# Patient Record
Sex: FEMALE | Race: OTHER | NOT HISPANIC OR LATINO
[De-identification: names, ages, dates, MRNs, and addresses within clinical notes are randomized per-mention and may not be internally consistent; named-entity substitution may affect disease eponyms.]

---

## 2019-01-14 ENCOUNTER — RESULT REVIEW (OUTPATIENT)
Age: 44
End: 2019-01-14

## 2019-05-13 PROBLEM — Z00.00 ENCOUNTER FOR PREVENTIVE HEALTH EXAMINATION: Status: ACTIVE | Noted: 2019-05-13

## 2019-05-16 ENCOUNTER — APPOINTMENT (OUTPATIENT)
Dept: ORTHOPEDIC SURGERY | Facility: CLINIC | Age: 44
End: 2019-05-16
Payer: COMMERCIAL

## 2019-05-16 VITALS
HEART RATE: 69 BPM | WEIGHT: 170 LBS | SYSTOLIC BLOOD PRESSURE: 107 MMHG | DIASTOLIC BLOOD PRESSURE: 72 MMHG | HEIGHT: 70 IN | BODY MASS INDEX: 24.34 KG/M2

## 2019-05-16 DIAGNOSIS — Z78.9 OTHER SPECIFIED HEALTH STATUS: ICD-10-CM

## 2019-05-16 DIAGNOSIS — Z80.9 FAMILY HISTORY OF MALIGNANT NEOPLASM, UNSPECIFIED: ICD-10-CM

## 2019-05-16 PROCEDURE — 73140 X-RAY EXAM OF FINGER(S): CPT | Mod: F7

## 2019-05-16 PROCEDURE — 99203 OFFICE O/P NEW LOW 30 MIN: CPT

## 2019-05-16 RX ORDER — ACETAMINOPHEN 325 MG/1
TABLET, FILM COATED ORAL
Refills: 0 | Status: ACTIVE | COMMUNITY

## 2019-05-16 NOTE — DISCUSSION/SUMMARY
[de-identified] : The underlying pathophysiology was reviewed with the patient. Treatment options were discussed including; observation and surgical intervention. \par \par Elective surgical intervention was discussed with the patient. The risks and benefits were reviewed. \par Patient was advised to continue with observation of her symptoms for the interim.

## 2019-05-16 NOTE — END OF VISIT
[FreeTextEntry3] : I, Trever Duckworth MD, ordering physician, have read and attest that all the information, medical decision making and discharge instructions within are true and accurate.

## 2019-05-16 NOTE — ADDENDUM
[FreeTextEntry1] : I, Maranda Hill wrote this note acting as a scribe for Dr. Trever Duckworth on May 16, 2019.

## 2019-05-16 NOTE — PHYSICAL EXAM
[de-identified] : AP, lateral and oblique views of the right hand were obtained today and revealed no abnormalities.there is a soft tissue mass in the long finger. [de-identified] : Patient is WDWN, alert, and in no acute distress. Breathing is unlabored. She is grossly oriented to person, place, and time. \par \par Right hand: Ulnar mass over the long finger. Additional masses over the PIP joint. There is no tenderness to palpation or pain with axial compression. There is full arc of motion in the fingers. All intrinsic and extrinsic hand muscles 5/5. No joint instability on provocative testing. Sensation is intact to light touch.

## 2019-05-16 NOTE — HISTORY OF PRESENT ILLNESS
[de-identified] : Pt is a 42 y/o RHD female with mass in right long finger for 4-5 years.  It has been getting larger over time.  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  The first operation was performed 15 years ago at [facility] by a physician whose name she cannot recall.  The second surgery was performed by Dr. Lee approximately 7 years ago.  She returned to Dr. Lee when the tumor returned.  She was seen in this office at that time and she was told to treat it conservatively.  She states that she was told that she also has cysts in her right long finger PIP joint.  Her ROM is restricted.  She does not generally have pain but it can be very painful if she bangs her finger against anything.  Denies numbness or tingling.\par

## 2019-08-01 ENCOUNTER — TRANSCRIPTION ENCOUNTER (OUTPATIENT)
Age: 44
End: 2019-08-01

## 2020-06-01 ENCOUNTER — TRANSCRIPTION ENCOUNTER (OUTPATIENT)
Age: 45
End: 2020-06-01

## 2021-06-22 ENCOUNTER — RESULT REVIEW (OUTPATIENT)
Age: 46
End: 2021-06-22

## 2022-12-19 ENCOUNTER — APPOINTMENT (OUTPATIENT)
Dept: OBGYN | Facility: CLINIC | Age: 47
End: 2022-12-19

## 2022-12-19 PROCEDURE — 76830 TRANSVAGINAL US NON-OB: CPT

## 2022-12-19 PROCEDURE — 82270 OCCULT BLOOD FECES: CPT

## 2022-12-19 PROCEDURE — 76856 US EXAM PELVIC COMPLETE: CPT | Mod: 59

## 2022-12-19 PROCEDURE — 81002 URINALYSIS NONAUTO W/O SCOPE: CPT

## 2022-12-19 PROCEDURE — 99396 PREV VISIT EST AGE 40-64: CPT | Mod: 25

## 2022-12-27 ENCOUNTER — NON-APPOINTMENT (OUTPATIENT)
Age: 47
End: 2022-12-27

## 2022-12-27 ENCOUNTER — APPOINTMENT (OUTPATIENT)
Dept: ORTHOPEDIC SURGERY | Facility: CLINIC | Age: 47
End: 2022-12-27
Payer: COMMERCIAL

## 2022-12-27 VITALS — BODY MASS INDEX: 24.34 KG/M2 | WEIGHT: 170 LBS | HEIGHT: 70 IN

## 2022-12-27 PROCEDURE — 99204 OFFICE O/P NEW MOD 45 MIN: CPT

## 2022-12-28 ENCOUNTER — APPOINTMENT (OUTPATIENT)
Dept: MAMMOGRAPHY | Facility: CLINIC | Age: 47
End: 2022-12-28

## 2022-12-28 PROCEDURE — 77067 SCR MAMMO BI INCL CAD: CPT

## 2022-12-28 PROCEDURE — 77063 BREAST TOMOSYNTHESIS BI: CPT

## 2022-12-31 ENCOUNTER — APPOINTMENT (OUTPATIENT)
Dept: MRI IMAGING | Facility: CLINIC | Age: 47
End: 2022-12-31

## 2023-01-05 ENCOUNTER — APPOINTMENT (OUTPATIENT)
Dept: OBGYN | Facility: CLINIC | Age: 48
End: 2023-01-05

## 2023-01-07 NOTE — PHYSICAL EXAM
[de-identified] : Gen: NAD\par RSP: Nonlabored\par MSK:\par JUANITA was seen and examined\par She has palpable masses along the right long finger about the PIPJ both proximally and distally.  She has restricted arc of motion with 3cm DPC at the long finger.  SILT throughout.  Incisions are cd/i, < 2s capillary refill

## 2023-01-07 NOTE — ASSESSMENT
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  She has had 2 prior excisions and has a stiff long finger.  She has had another recurrence.  Discussed medical therapy such as pexidartinib, repeat surgical excision with contracture release, amputation.  Would recommend MRI prior to further discussion regarding treatment plan.\par \par Plan:\par MRI R Hand to assess GCTTS at the long finger\par F/u after MRI

## 2023-01-07 NOTE — HISTORY OF PRESENT ILLNESS
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  She has had two prior surgeries.  The first was performed at [facility] approximately 20 years ago and the second by Dr. Lee 10 years ago.  She has palpable recurrence.  She reports restricted ROM at the digit which makes her hesitant to perform another surgery.  Discussed pexidartinib and its side effect profile.  She has not had treatment with this medication in the past.  She has no numbness and tingling.  Discussed that I would not expect improvement in ROM and in fact she may have more stiffness from surgery.

## 2023-01-17 ENCOUNTER — APPOINTMENT (OUTPATIENT)
Dept: MRI IMAGING | Facility: CLINIC | Age: 48
End: 2023-01-17
Payer: COMMERCIAL

## 2023-01-17 ENCOUNTER — OUTPATIENT (OUTPATIENT)
Dept: OUTPATIENT SERVICES | Facility: HOSPITAL | Age: 48
LOS: 1 days | End: 2023-01-17
Payer: COMMERCIAL

## 2023-01-17 DIAGNOSIS — D48.9 NEOPLASM OF UNCERTAIN BEHAVIOR, UNSPECIFIED: ICD-10-CM

## 2023-01-17 PROCEDURE — 73220 MRI UPPR EXTREMITY W/O&W/DYE: CPT | Mod: 26,RT

## 2023-01-17 PROCEDURE — A9585: CPT

## 2023-01-17 PROCEDURE — 73220 MRI UPPR EXTREMITY W/O&W/DYE: CPT

## 2023-01-18 ENCOUNTER — APPOINTMENT (OUTPATIENT)
Dept: ULTRASOUND IMAGING | Facility: CLINIC | Age: 48
End: 2023-01-18
Payer: COMMERCIAL

## 2023-01-18 ENCOUNTER — OUTPATIENT (OUTPATIENT)
Dept: OUTPATIENT SERVICES | Facility: HOSPITAL | Age: 48
LOS: 1 days | End: 2023-01-18
Payer: COMMERCIAL

## 2023-01-18 DIAGNOSIS — Z00.8 ENCOUNTER FOR OTHER GENERAL EXAMINATION: ICD-10-CM

## 2023-01-18 PROCEDURE — 76641 ULTRASOUND BREAST COMPLETE: CPT | Mod: 26,50

## 2023-01-18 PROCEDURE — 76641 ULTRASOUND BREAST COMPLETE: CPT

## 2023-02-02 ENCOUNTER — APPOINTMENT (OUTPATIENT)
Dept: OBGYN | Facility: CLINIC | Age: 48
End: 2023-02-02
Payer: COMMERCIAL

## 2023-02-02 PROCEDURE — 58301 REMOVE INTRAUTERINE DEVICE: CPT

## 2023-02-02 PROCEDURE — 76830 TRANSVAGINAL US NON-OB: CPT

## 2023-02-02 PROCEDURE — 36415 COLL VENOUS BLD VENIPUNCTURE: CPT

## 2023-02-02 PROCEDURE — 99213 OFFICE O/P EST LOW 20 MIN: CPT | Mod: 25

## 2023-02-05 ENCOUNTER — TRANSCRIPTION ENCOUNTER (OUTPATIENT)
Age: 48
End: 2023-02-05

## 2023-02-07 ENCOUNTER — APPOINTMENT (OUTPATIENT)
Dept: OBGYN | Facility: CLINIC | Age: 48
End: 2023-02-07
Payer: COMMERCIAL

## 2023-02-07 PROCEDURE — 58300 INSERT INTRAUTERINE DEVICE: CPT

## 2023-02-07 PROCEDURE — 76830 TRANSVAGINAL US NON-OB: CPT

## 2023-02-07 PROCEDURE — 81025 URINE PREGNANCY TEST: CPT

## 2023-02-16 ENCOUNTER — APPOINTMENT (OUTPATIENT)
Dept: MRI IMAGING | Facility: CLINIC | Age: 48
End: 2023-02-16
Payer: COMMERCIAL

## 2023-02-16 ENCOUNTER — OUTPATIENT (OUTPATIENT)
Dept: OUTPATIENT SERVICES | Facility: HOSPITAL | Age: 48
LOS: 1 days | End: 2023-02-16
Payer: COMMERCIAL

## 2023-02-16 DIAGNOSIS — Z00.8 ENCOUNTER FOR OTHER GENERAL EXAMINATION: ICD-10-CM

## 2023-02-16 PROCEDURE — C8937: CPT

## 2023-02-16 PROCEDURE — C8908: CPT

## 2023-02-16 PROCEDURE — A9585: CPT

## 2023-02-16 PROCEDURE — 77049 MRI BREAST C-+ W/CAD BI: CPT | Mod: 26

## 2023-02-17 ENCOUNTER — APPOINTMENT (OUTPATIENT)
Dept: ORTHOPEDIC SURGERY | Facility: CLINIC | Age: 48
End: 2023-02-17
Payer: COMMERCIAL

## 2023-02-17 PROCEDURE — 99214 OFFICE O/P EST MOD 30 MIN: CPT | Mod: 57

## 2023-02-20 ENCOUNTER — APPOINTMENT (OUTPATIENT)
Dept: OBGYN | Facility: CLINIC | Age: 48
End: 2023-02-20

## 2023-02-28 ENCOUNTER — APPOINTMENT (OUTPATIENT)
Dept: ULTRASOUND IMAGING | Facility: CLINIC | Age: 48
End: 2023-02-28
Payer: COMMERCIAL

## 2023-02-28 ENCOUNTER — RESULT REVIEW (OUTPATIENT)
Age: 48
End: 2023-02-28

## 2023-02-28 ENCOUNTER — APPOINTMENT (OUTPATIENT)
Dept: ULTRASOUND IMAGING | Facility: CLINIC | Age: 48
End: 2023-02-28

## 2023-02-28 ENCOUNTER — OUTPATIENT (OUTPATIENT)
Dept: OUTPATIENT SERVICES | Facility: HOSPITAL | Age: 48
LOS: 1 days | End: 2023-02-28
Payer: COMMERCIAL

## 2023-02-28 DIAGNOSIS — N63.0 UNSPECIFIED LUMP IN UNSPECIFIED BREAST: ICD-10-CM

## 2023-02-28 PROCEDURE — 77066 DX MAMMO INCL CAD BI: CPT | Mod: 26

## 2023-02-28 PROCEDURE — 77066 DX MAMMO INCL CAD BI: CPT

## 2023-02-28 PROCEDURE — 19084 BX BREAST ADD LESION US IMAG: CPT

## 2023-02-28 PROCEDURE — 88305 TISSUE EXAM BY PATHOLOGIST: CPT | Mod: 26

## 2023-02-28 PROCEDURE — 88305 TISSUE EXAM BY PATHOLOGIST: CPT

## 2023-02-28 PROCEDURE — A4648: CPT

## 2023-02-28 PROCEDURE — 19084 BX BREAST ADD LESION US IMAG: CPT | Mod: RT

## 2023-02-28 PROCEDURE — 19083 BX BREAST 1ST LESION US IMAG: CPT | Mod: LT

## 2023-02-28 PROCEDURE — 19083 BX BREAST 1ST LESION US IMAG: CPT

## 2023-03-13 ENCOUNTER — APPOINTMENT (OUTPATIENT)
Dept: OBGYN | Facility: CLINIC | Age: 48
End: 2023-03-13
Payer: COMMERCIAL

## 2023-03-13 PROCEDURE — 99213 OFFICE O/P EST LOW 20 MIN: CPT

## 2023-03-13 PROCEDURE — 76830 TRANSVAGINAL US NON-OB: CPT

## 2023-04-02 ENCOUNTER — NON-APPOINTMENT (OUTPATIENT)
Age: 48
End: 2023-04-02

## 2023-04-20 ENCOUNTER — OUTPATIENT (OUTPATIENT)
Dept: OUTPATIENT SERVICES | Facility: HOSPITAL | Age: 48
LOS: 1 days | End: 2023-04-20

## 2023-04-20 VITALS
HEART RATE: 72 BPM | DIASTOLIC BLOOD PRESSURE: 80 MMHG | SYSTOLIC BLOOD PRESSURE: 114 MMHG | WEIGHT: 179.9 LBS | TEMPERATURE: 97 F | HEIGHT: 70 IN | OXYGEN SATURATION: 98 % | RESPIRATION RATE: 16 BRPM

## 2023-04-20 DIAGNOSIS — D48.9 NEOPLASM OF UNCERTAIN BEHAVIOR, UNSPECIFIED: ICD-10-CM

## 2023-04-20 DIAGNOSIS — Z98.890 OTHER SPECIFIED POSTPROCEDURAL STATES: Chronic | ICD-10-CM

## 2023-04-20 DIAGNOSIS — R22.31 LOCALIZED SWELLING, MASS AND LUMP, RIGHT UPPER LIMB: ICD-10-CM

## 2023-04-20 LAB
HCG SERPL-ACNC: <5 MIU/ML — SIGNIFICANT CHANGE UP
HCT VFR BLD CALC: 35.7 % — SIGNIFICANT CHANGE UP (ref 34.5–45)
HGB BLD-MCNC: 10.9 G/DL — LOW (ref 11.5–15.5)
MCHC RBC-ENTMCNC: 22.2 PG — LOW (ref 27–34)
MCHC RBC-ENTMCNC: 30.5 GM/DL — LOW (ref 32–36)
MCV RBC AUTO: 72.7 FL — LOW (ref 80–100)
NRBC # BLD: 0 /100 WBCS — SIGNIFICANT CHANGE UP (ref 0–0)
NRBC # FLD: 0 K/UL — SIGNIFICANT CHANGE UP (ref 0–0)
PLATELET # BLD AUTO: 256 K/UL — SIGNIFICANT CHANGE UP (ref 150–400)
RBC # BLD: 4.91 M/UL — SIGNIFICANT CHANGE UP (ref 3.8–5.2)
RBC # FLD: 16 % — HIGH (ref 10.3–14.5)
WBC # BLD: 6.25 K/UL — SIGNIFICANT CHANGE UP (ref 3.8–10.5)
WBC # FLD AUTO: 6.25 K/UL — SIGNIFICANT CHANGE UP (ref 3.8–10.5)

## 2023-04-20 NOTE — H&P PST ADULT - PROBLEM SELECTOR PLAN 1
Patient scheduled for surgery on: 4/27/23  Patient provided with verbal and written presurgical instructions  Verbalized understanding  with teach back on the following: Famotidine for GI prophylaxis with small sip of water     Problem: Pre-menopausal   Urine specimen cup provided, UCG on DOS

## 2023-04-20 NOTE — H&P PST ADULT - MUSCULOSKELETAL COMMENTS
right middle finger with hard lateral mass, limited flexion of finger , Denies tenderness right middle finger mass

## 2023-04-20 NOTE — H&P PST ADULT - HISTORY OF PRESENT ILLNESS
47 yr old female presents for evaluation for right middle finger mass excision, reports mass has been removed twice in the past

## 2023-04-20 NOTE — H&P PST ADULT - NSANTHOSAYNRD_GEN_A_CORE
No. LAZARA screening performed.  STOP BANG Legend: 0-2 = LOW Risk; 3-4 = INTERMEDIATE Risk; 5-8 = HIGH Risk

## 2023-04-26 ENCOUNTER — TRANSCRIPTION ENCOUNTER (OUTPATIENT)
Age: 48
End: 2023-04-26

## 2023-04-27 ENCOUNTER — OUTPATIENT (OUTPATIENT)
Dept: OUTPATIENT SERVICES | Facility: HOSPITAL | Age: 48
LOS: 1 days | Discharge: HOME | End: 2023-04-27
Payer: COMMERCIAL

## 2023-04-27 ENCOUNTER — APPOINTMENT (OUTPATIENT)
Dept: ORTHOPEDIC SURGERY | Facility: AMBULATORY SURGERY CENTER | Age: 48
End: 2023-04-27

## 2023-04-27 ENCOUNTER — TRANSCRIPTION ENCOUNTER (OUTPATIENT)
Age: 48
End: 2023-04-27

## 2023-04-27 ENCOUNTER — RESULT REVIEW (OUTPATIENT)
Age: 48
End: 2023-04-27

## 2023-04-27 VITALS
RESPIRATION RATE: 18 BRPM | OXYGEN SATURATION: 99 % | SYSTOLIC BLOOD PRESSURE: 104 MMHG | WEIGHT: 180.78 LBS | TEMPERATURE: 98 F | HEART RATE: 61 BPM | DIASTOLIC BLOOD PRESSURE: 75 MMHG

## 2023-04-27 VITALS
HEART RATE: 69 BPM | TEMPERATURE: 98 F | DIASTOLIC BLOOD PRESSURE: 62 MMHG | OXYGEN SATURATION: 100 % | RESPIRATION RATE: 16 BRPM | SYSTOLIC BLOOD PRESSURE: 107 MMHG

## 2023-04-27 DIAGNOSIS — Z98.890 OTHER SPECIFIED POSTPROCEDURAL STATES: Chronic | ICD-10-CM

## 2023-04-27 DIAGNOSIS — R22.31 LOCALIZED SWELLING, MASS AND LUMP, RIGHT UPPER LIMB: ICD-10-CM

## 2023-04-27 PROCEDURE — 88342 IMHCHEM/IMCYTCHM 1ST ANTB: CPT | Mod: 26

## 2023-04-27 PROCEDURE — 88307 TISSUE EXAM BY PATHOLOGIST: CPT | Mod: 26

## 2023-04-27 PROCEDURE — 88311 DECALCIFY TISSUE: CPT | Mod: 26

## 2023-04-27 PROCEDURE — 26118 RAD RESECT HAND TUMOR 3 CM/>: CPT | Mod: F7

## 2023-04-27 RX ORDER — OXYCODONE 5 MG/1
5 TABLET ORAL
Qty: 5 | Refills: 0 | Status: ACTIVE | COMMUNITY
Start: 2023-04-27 | End: 1900-01-01

## 2023-04-27 NOTE — ASU DISCHARGE PLAN (ADULT/PEDIATRIC) - NS MD DC FALL RISK RISK
For information on Fall & Injury Prevention, visit: [email or web address] OR  [email or web address] OR  https://www.cdc.gov/steadi/patient.html

## 2023-04-27 NOTE — BRIEF OPERATIVE NOTE - NSICDXBRIEFPROCEDURE_GEN_ALL_CORE_FT
PROCEDURES:  Excision, soft tissue tumor, hand, subfascial, 1.5 cm or greater 27-Apr-2023 16:22:52  Nima Kent  
DISPLAY PLAN FREE TEXT

## 2023-04-27 NOTE — ASU PREOPERATIVE ASSESSMENT, ADULT (IPARK ONLY) - FALL HARM RISK - UNIVERSAL INTERVENTIONS
Bed in lowest position, wheels locked, appropriate side rails in place/Call bell, personal items and telephone in reach/Instruct patient to call for assistance before getting out of bed or chair/Non-slip footwear when patient is out of bed/Nada to call system/Physically safe environment - no spills, clutter or unnecessary equipment/Purposeful Proactive Rounding/Room/bathroom lighting operational, light cord in reach

## 2023-04-27 NOTE — ASU DISCHARGE PLAN (ADULT/PEDIATRIC) - CONDITION AT DISCHARGE
[address] [place] [address] Patient: Marvin David MRN: [id number] :1992 A check toro indicates which time of day the medication should be taken. My Medications ASK your doctor about these medications Instructions Each Dose to Equal  
 Morning Noon Evening Bedtime  
 ondansetron 4 mg disintegrating tablet Commonly known as:  ZOFRAN ODT Your last dose was: Your next dose is: Take 1 Tab by mouth every eight (8) hours as needed for Nausea. 4 mg  
    
   
   
   
  
 PANZNLDE11-XGUP alvina-folic-dha -306 mg-mcg-mg Cmpk Commonly known as:  PRENATAL DHA+COMPLETE PRENATAL Your last dose was: Your next dose is: Take 1 Tab by mouth daily. 1 Tab Stable

## 2023-04-27 NOTE — ASU DISCHARGE PLAN (ADULT/PEDIATRIC) - CARE PROVIDER_API CALL
Jass Mishra)  Orthopedics  [address] [place] [address]  Phone: [phone]  Fax: [phone]  Follow Up Time:

## 2023-04-28 PROBLEM — D48.9 NEOPLASM OF UNCERTAIN BEHAVIOR, UNSPECIFIED: Chronic | Status: ACTIVE | Noted: 2023-04-20

## 2023-05-01 ENCOUNTER — APPOINTMENT (OUTPATIENT)
Dept: ORTHOPEDIC SURGERY | Facility: CLINIC | Age: 48
End: 2023-05-01
Payer: COMMERCIAL

## 2023-05-01 VITALS
OXYGEN SATURATION: 99 % | HEIGHT: 70 IN | HEART RATE: 80 BPM | DIASTOLIC BLOOD PRESSURE: 79 MMHG | WEIGHT: 170 LBS | TEMPERATURE: 98.1 F | SYSTOLIC BLOOD PRESSURE: 123 MMHG | BODY MASS INDEX: 24.34 KG/M2

## 2023-05-01 PROCEDURE — 99024 POSTOP FOLLOW-UP VISIT: CPT

## 2023-05-01 RX ORDER — METHYLPREDNISOLONE 4 MG/1
4 TABLET ORAL
Qty: 1 | Refills: 0 | Status: ACTIVE | COMMUNITY
Start: 2023-05-01 | End: 1900-01-01

## 2023-05-03 ENCOUNTER — APPOINTMENT (OUTPATIENT)
Dept: MRI IMAGING | Facility: IMAGING CENTER | Age: 48
End: 2023-05-03

## 2023-05-08 ENCOUNTER — APPOINTMENT (OUTPATIENT)
Dept: ORTHOPEDIC SURGERY | Facility: CLINIC | Age: 48
End: 2023-05-08

## 2023-05-10 ENCOUNTER — APPOINTMENT (OUTPATIENT)
Dept: ORTHOPEDIC SURGERY | Facility: CLINIC | Age: 48
End: 2023-05-10
Payer: COMMERCIAL

## 2023-05-10 PROCEDURE — 99024 POSTOP FOLLOW-UP VISIT: CPT

## 2023-05-10 RX ORDER — METHYLPREDNISOLONE 4 MG/1
4 TABLET ORAL
Qty: 1 | Refills: 0 | Status: ACTIVE | COMMUNITY
Start: 2023-05-10 | End: 1900-01-01

## 2023-05-15 LAB — SURGICAL PATHOLOGY STUDY: SIGNIFICANT CHANGE UP

## 2023-05-31 ENCOUNTER — APPOINTMENT (OUTPATIENT)
Dept: ORTHOPEDIC SURGERY | Facility: CLINIC | Age: 48
End: 2023-05-31

## 2023-06-06 ENCOUNTER — OUTPATIENT (OUTPATIENT)
Dept: OUTPATIENT SERVICES | Facility: HOSPITAL | Age: 48
LOS: 1 days | End: 2023-06-06
Payer: COMMERCIAL

## 2023-06-06 ENCOUNTER — RESULT REVIEW (OUTPATIENT)
Age: 48
End: 2023-06-06

## 2023-06-06 ENCOUNTER — APPOINTMENT (OUTPATIENT)
Dept: MRI IMAGING | Facility: CLINIC | Age: 48
End: 2023-06-06
Payer: COMMERCIAL

## 2023-06-06 DIAGNOSIS — R92.8 OTHER ABNORMAL AND INCONCLUSIVE FINDINGS ON DIAGNOSTIC IMAGING OF BREAST: ICD-10-CM

## 2023-06-06 DIAGNOSIS — Z98.890 OTHER SPECIFIED POSTPROCEDURAL STATES: Chronic | ICD-10-CM

## 2023-06-06 PROCEDURE — 19086 BX BREAST ADD LESION MR IMAG: CPT | Mod: LT

## 2023-06-06 PROCEDURE — A4648: CPT

## 2023-06-06 PROCEDURE — 19085 BX BREAST 1ST LESION MR IMAG: CPT

## 2023-06-06 PROCEDURE — 77066 DX MAMMO INCL CAD BI: CPT | Mod: 26

## 2023-06-06 PROCEDURE — 19086 BX BREAST ADD LESION MR IMAG: CPT

## 2023-06-06 PROCEDURE — 88305 TISSUE EXAM BY PATHOLOGIST: CPT | Mod: 26

## 2023-06-06 PROCEDURE — A9585: CPT

## 2023-06-06 PROCEDURE — 77066 DX MAMMO INCL CAD BI: CPT

## 2023-06-06 PROCEDURE — 88305 TISSUE EXAM BY PATHOLOGIST: CPT

## 2023-06-06 PROCEDURE — 19085 BX BREAST 1ST LESION MR IMAG: CPT | Mod: RT

## 2023-06-07 ENCOUNTER — APPOINTMENT (OUTPATIENT)
Dept: ORTHOPEDIC SURGERY | Facility: CLINIC | Age: 48
End: 2023-06-07

## 2023-06-12 LAB — SURGICAL PATHOLOGY STUDY: SIGNIFICANT CHANGE UP

## 2023-06-19 ENCOUNTER — APPOINTMENT (OUTPATIENT)
Dept: ORTHOPEDIC SURGERY | Facility: CLINIC | Age: 48
End: 2023-06-19
Payer: COMMERCIAL

## 2023-06-19 DIAGNOSIS — D48.9 NEOPLASM OF UNCERTAIN BEHAVIOR, UNSPECIFIED: ICD-10-CM

## 2023-06-19 DIAGNOSIS — R22.31 LOCALIZED SWELLING, MASS AND LUMP, RIGHT UPPER LIMB: ICD-10-CM

## 2023-06-19 PROCEDURE — 99024 POSTOP FOLLOW-UP VISIT: CPT

## 2023-06-19 RX ORDER — MELOXICAM 15 MG/1
15 TABLET ORAL
Qty: 30 | Refills: 11 | Status: ACTIVE | COMMUNITY
Start: 2023-06-19 | End: 1900-01-01

## 2023-07-19 ENCOUNTER — APPOINTMENT (OUTPATIENT)
Dept: ORTHOPEDIC SURGERY | Facility: CLINIC | Age: 48
End: 2023-07-19

## 2023-10-03 PROBLEM — Z00.00 ENCOUNTER FOR PREVENTIVE HEALTH EXAMINATION: Status: ACTIVE | Noted: 2023-10-03

## 2023-10-10 ENCOUNTER — APPOINTMENT (OUTPATIENT)
Dept: OBGYN | Facility: CLINIC | Age: 48
End: 2023-10-10
Payer: COMMERCIAL

## 2023-10-10 PROCEDURE — 99213 OFFICE O/P EST LOW 20 MIN: CPT | Mod: 25

## 2023-10-10 PROCEDURE — 76830 TRANSVAGINAL US NON-OB: CPT

## 2023-10-10 PROCEDURE — 58301 REMOVE INTRAUTERINE DEVICE: CPT

## 2023-10-31 ENCOUNTER — APPOINTMENT (OUTPATIENT)
Dept: OBGYN | Facility: CLINIC | Age: 48
End: 2023-10-31
Payer: COMMERCIAL

## 2023-10-31 PROCEDURE — 58300 INSERT INTRAUTERINE DEVICE: CPT

## 2023-10-31 PROCEDURE — 76830 TRANSVAGINAL US NON-OB: CPT

## 2023-10-31 PROCEDURE — 81025 URINE PREGNANCY TEST: CPT

## 2023-11-02 ENCOUNTER — APPOINTMENT (OUTPATIENT)
Dept: CT IMAGING | Facility: CLINIC | Age: 48
End: 2023-11-02

## 2023-11-14 ENCOUNTER — APPOINTMENT (OUTPATIENT)
Dept: CT IMAGING | Facility: CLINIC | Age: 48
End: 2023-11-14
Payer: COMMERCIAL

## 2023-11-14 ENCOUNTER — OUTPATIENT (OUTPATIENT)
Dept: OUTPATIENT SERVICES | Facility: HOSPITAL | Age: 48
LOS: 1 days | End: 2023-11-14
Payer: COMMERCIAL

## 2023-11-14 DIAGNOSIS — Z98.890 OTHER SPECIFIED POSTPROCEDURAL STATES: Chronic | ICD-10-CM

## 2023-11-14 DIAGNOSIS — Z00.00 ENCOUNTER FOR GENERAL ADULT MEDICAL EXAMINATION WITHOUT ABNORMAL FINDINGS: ICD-10-CM

## 2023-11-14 PROCEDURE — 74174 CTA ABD&PLVS W/CONTRAST: CPT | Mod: 26

## 2023-11-14 PROCEDURE — 74174 CTA ABD&PLVS W/CONTRAST: CPT

## 2023-11-21 ENCOUNTER — OUTPATIENT (OUTPATIENT)
Dept: OUTPATIENT SERVICES | Facility: HOSPITAL | Age: 48
LOS: 1 days | End: 2023-11-21
Payer: COMMERCIAL

## 2023-11-21 ENCOUNTER — APPOINTMENT (OUTPATIENT)
Dept: CT IMAGING | Facility: IMAGING CENTER | Age: 48
End: 2023-11-21
Payer: COMMERCIAL

## 2023-11-21 DIAGNOSIS — Z98.890 OTHER SPECIFIED POSTPROCEDURAL STATES: Chronic | ICD-10-CM

## 2023-11-21 DIAGNOSIS — Z00.8 ENCOUNTER FOR OTHER GENERAL EXAMINATION: ICD-10-CM

## 2023-11-21 PROCEDURE — 74174 CTA ABD&PLVS W/CONTRAST: CPT | Mod: 26

## 2023-11-21 PROCEDURE — 74174 CTA ABD&PLVS W/CONTRAST: CPT

## 2023-11-24 ENCOUNTER — OUTPATIENT (OUTPATIENT)
Dept: OUTPATIENT SERVICES | Facility: HOSPITAL | Age: 48
LOS: 1 days | End: 2023-11-24

## 2023-11-24 VITALS
DIASTOLIC BLOOD PRESSURE: 82 MMHG | OXYGEN SATURATION: 98 % | TEMPERATURE: 98 F | WEIGHT: 179.9 LBS | HEART RATE: 80 BPM | HEIGHT: 70 IN | SYSTOLIC BLOOD PRESSURE: 114 MMHG | RESPIRATION RATE: 16 BRPM

## 2023-11-24 DIAGNOSIS — Z90.13 ACQUIRED ABSENCE OF BILATERAL BREASTS AND NIPPLES: ICD-10-CM

## 2023-11-24 DIAGNOSIS — D48.61 NEOPLASM OF UNCERTAIN BEHAVIOR OF RIGHT BREAST: ICD-10-CM

## 2023-11-24 DIAGNOSIS — Z98.890 OTHER SPECIFIED POSTPROCEDURAL STATES: Chronic | ICD-10-CM

## 2023-11-24 LAB
ANION GAP SERPL CALC-SCNC: 9 MMOL/L — SIGNIFICANT CHANGE UP (ref 7–14)
ANION GAP SERPL CALC-SCNC: 9 MMOL/L — SIGNIFICANT CHANGE UP (ref 7–14)
BLD GP AB SCN SERPL QL: NEGATIVE — SIGNIFICANT CHANGE UP
BLD GP AB SCN SERPL QL: NEGATIVE — SIGNIFICANT CHANGE UP
BUN SERPL-MCNC: 10 MG/DL — SIGNIFICANT CHANGE UP (ref 7–23)
BUN SERPL-MCNC: 10 MG/DL — SIGNIFICANT CHANGE UP (ref 7–23)
CALCIUM SERPL-MCNC: 9.5 MG/DL — SIGNIFICANT CHANGE UP (ref 8.4–10.5)
CALCIUM SERPL-MCNC: 9.5 MG/DL — SIGNIFICANT CHANGE UP (ref 8.4–10.5)
CHLORIDE SERPL-SCNC: 108 MMOL/L — HIGH (ref 98–107)
CHLORIDE SERPL-SCNC: 108 MMOL/L — HIGH (ref 98–107)
CO2 SERPL-SCNC: 25 MMOL/L — SIGNIFICANT CHANGE UP (ref 22–31)
CO2 SERPL-SCNC: 25 MMOL/L — SIGNIFICANT CHANGE UP (ref 22–31)
CREAT SERPL-MCNC: 0.69 MG/DL — SIGNIFICANT CHANGE UP (ref 0.5–1.3)
CREAT SERPL-MCNC: 0.69 MG/DL — SIGNIFICANT CHANGE UP (ref 0.5–1.3)
EGFR: 109 ML/MIN/1.73M2 — SIGNIFICANT CHANGE UP
EGFR: 109 ML/MIN/1.73M2 — SIGNIFICANT CHANGE UP
GLUCOSE SERPL-MCNC: 87 MG/DL — SIGNIFICANT CHANGE UP (ref 70–99)
GLUCOSE SERPL-MCNC: 87 MG/DL — SIGNIFICANT CHANGE UP (ref 70–99)
HCG SERPL-ACNC: <1 MIU/ML — SIGNIFICANT CHANGE UP
HCG SERPL-ACNC: <1 MIU/ML — SIGNIFICANT CHANGE UP
HCT VFR BLD CALC: 34.8 % — SIGNIFICANT CHANGE UP (ref 34.5–45)
HCT VFR BLD CALC: 34.8 % — SIGNIFICANT CHANGE UP (ref 34.5–45)
HGB BLD-MCNC: 10.4 G/DL — LOW (ref 11.5–15.5)
HGB BLD-MCNC: 10.4 G/DL — LOW (ref 11.5–15.5)
MCHC RBC-ENTMCNC: 21.2 PG — LOW (ref 27–34)
MCHC RBC-ENTMCNC: 21.2 PG — LOW (ref 27–34)
MCHC RBC-ENTMCNC: 29.9 GM/DL — LOW (ref 32–36)
MCHC RBC-ENTMCNC: 29.9 GM/DL — LOW (ref 32–36)
MCV RBC AUTO: 70.9 FL — LOW (ref 80–100)
MCV RBC AUTO: 70.9 FL — LOW (ref 80–100)
NRBC # BLD: 0 /100 WBCS — SIGNIFICANT CHANGE UP (ref 0–0)
NRBC # BLD: 0 /100 WBCS — SIGNIFICANT CHANGE UP (ref 0–0)
NRBC # FLD: 0 K/UL — SIGNIFICANT CHANGE UP (ref 0–0)
NRBC # FLD: 0 K/UL — SIGNIFICANT CHANGE UP (ref 0–0)
PLATELET # BLD AUTO: 231 K/UL — SIGNIFICANT CHANGE UP (ref 150–400)
PLATELET # BLD AUTO: 231 K/UL — SIGNIFICANT CHANGE UP (ref 150–400)
POTASSIUM SERPL-MCNC: 4.4 MMOL/L — SIGNIFICANT CHANGE UP (ref 3.5–5.3)
POTASSIUM SERPL-MCNC: 4.4 MMOL/L — SIGNIFICANT CHANGE UP (ref 3.5–5.3)
POTASSIUM SERPL-SCNC: 4.4 MMOL/L — SIGNIFICANT CHANGE UP (ref 3.5–5.3)
POTASSIUM SERPL-SCNC: 4.4 MMOL/L — SIGNIFICANT CHANGE UP (ref 3.5–5.3)
RBC # BLD: 4.91 M/UL — SIGNIFICANT CHANGE UP (ref 3.8–5.2)
RBC # BLD: 4.91 M/UL — SIGNIFICANT CHANGE UP (ref 3.8–5.2)
RBC # FLD: 18.2 % — HIGH (ref 10.3–14.5)
RBC # FLD: 18.2 % — HIGH (ref 10.3–14.5)
RH IG SCN BLD-IMP: POSITIVE — SIGNIFICANT CHANGE UP
SODIUM SERPL-SCNC: 142 MMOL/L — SIGNIFICANT CHANGE UP (ref 135–145)
SODIUM SERPL-SCNC: 142 MMOL/L — SIGNIFICANT CHANGE UP (ref 135–145)
WBC # BLD: 4.51 K/UL — SIGNIFICANT CHANGE UP (ref 3.8–10.5)
WBC # BLD: 4.51 K/UL — SIGNIFICANT CHANGE UP (ref 3.8–10.5)
WBC # FLD AUTO: 4.51 K/UL — SIGNIFICANT CHANGE UP (ref 3.8–10.5)
WBC # FLD AUTO: 4.51 K/UL — SIGNIFICANT CHANGE UP (ref 3.8–10.5)

## 2023-11-24 RX ORDER — SODIUM CHLORIDE 9 MG/ML
1000 INJECTION, SOLUTION INTRAVENOUS
Refills: 0 | Status: DISCONTINUED | OUTPATIENT
Start: 2023-11-30 | End: 2023-12-01

## 2023-11-24 NOTE — H&P PST ADULT - HISTORY OF PRESENT ILLNESS
48 y/o female with hx  46 y/o female with hx neoplasm of uncertain behavior of breasts, presents for preop evaluation for Bilateral mastectomy , bilateral sentinel lymph node biopsy bilateral bibiana flap, bilateral internum mammary lymph node excision tentatively for 11/30/23.

## 2023-11-24 NOTE — H&P PST ADULT - PROBLEM SELECTOR PLAN 1
Scheduled for Bilateral mastectomy , bilateral sentinel lymph node biopsy bilateral bibiana flap, bilateral internum mammary lymph node excision tentatively for 11/30/23.  Pre-op instructions provided. Pt given verbal and written instructions with teach back on chlorhexidine shampoo and Pepcid. Pt verbalized understanding with return demonstration.   Pending labs

## 2023-11-24 NOTE — H&P PST ADULT - NSICDXFAMILYHX_GEN_ALL_CORE_FT
FAMILY HISTORY:  Father  Still living? Yes, Estimated age: Age Unknown  Family history of diabetes mellitus (DM), Age at diagnosis: Age Unknown    Grandparent  Still living? No  FH: breast cancer, Age at diagnosis: Age Unknown

## 2023-11-24 NOTE — H&P PST ADULT - RESPIRATORY
normal/no wheezes/no rales/no rhonchi normal/clear to auscultation bilaterally/no wheezes/no rales/no rhonchi/airway patent/breath sounds equal

## 2023-11-28 ENCOUNTER — APPOINTMENT (OUTPATIENT)
Dept: OBGYN | Facility: CLINIC | Age: 48
End: 2023-11-28
Payer: COMMERCIAL

## 2023-11-28 PROCEDURE — 76830 TRANSVAGINAL US NON-OB: CPT

## 2023-11-28 PROCEDURE — 99212 OFFICE O/P EST SF 10 MIN: CPT

## 2023-11-29 ENCOUNTER — TRANSCRIPTION ENCOUNTER (OUTPATIENT)
Age: 48
End: 2023-11-29

## 2023-11-29 NOTE — ASU PATIENT PROFILE, ADULT - NSICDXPASTMEDICALHX_GEN_ALL_CORE_FT
After Visit Summary   11/27/2017    Steve Doran    MRN: [id number]           Patient Information     Date Of Birth          1990        Visit Information        Provider Department      11/27/2017 9:00 AM Lakesha Copeland MD [facility]        Today's Diagnoses     Upper respiratory tract infection, unspecified type    -  1      Care Instructions    [facility]    If you have any questions regarding to your visit please contact your care team:       Team Red:   Clinic Hours Telephone Number   Dr. Lakesha Wells, NP   7am-7pm  Monday - Thursday   7am-5pm  Fridays  [phone]  (Appointment scheduling available 24/7)    Questions about your visit?   Team Line  [phone]   Urgent Care - [place] and [facility] - 11am-9pm Monday-Friday Saturday-Sunday- 9am-5pm   [place] - 5pm-9pm Monday-Friday Saturday-Sunday- 9am-5pm  [phone] - [place]   [phone] - [place]       What options do I have for visits at the clinic other than the traditional office visit?  To expand how we care for you, many of our providers are utilizing electronic visits (e-visits) and telephone visits, when medically appropriate, for interactions with their patients rather than a visit in the clinic.   We also offer nurse visits for many medical concerns. Just like any other service, we will bill your insurance company for this type of visit based on time spent on the phone with your provider. Not all insurance companies cover these visits. Please check with your medical insurance if this type of visit is covered. You will be responsible for any charges that are not paid by your insurance.      E-visits via Modabound:  generally incur a $35.00 fee.  Telephone visits:  Time spent on the phone: *charged based on time that is spent on the phone in increments of 10 minutes. Estimated cost:   5-10 mins $30.00   11-20 mins. $59.00  "  21-30 mins. $85.00     Use [employer] (secure email communication and access to your chart) to send your primary care provider a message or make an appointment. Ask someone on your Team how to sign up for m[employer]M [employer].  For a Price Quote for your services, please call our Consumer Price Line at [phone].      As always, Thank you for trusting us with your health care needs!  Discharged by ADRIAN Plummer            Follow-ups after your visit        Follow-up notes from your care team     Return if symptoms worsen or fail to improve.      Who to contact     If you have questions or need follow up information about today's clinic visit or your schedule please contact [facility] directly at [phone].  Normal or non-critical lab and imaging results will be communicated to you by [employer], letter or phone within 4 business days after the clinic has received the results. If you do not hear from us within 7 days, please contact the clinic through [employer] or phone. If you have a critical or abnormal lab result, we will notify you by phone as soon as possible.  Submit refill requests through Grocio or call your pharmacy and they will forward the refill request to us. Please allow 3 business days for your refill to be completed.          Additional Information About Your Visit        [employer] Information     [employer] gives you secure access to your electronic health record. If you see a primary care provider, you can also send messages to your care team and make appointments. If you have questions, please call your primary care clinic.  If you do not have a primary care provider, please call [phone] and they will assist you.        Care EveryWhere ID     This is your Care EveryWhere ID. This could be used by other organizations to access your [facility] medical records  [id number]        Your Vitals Were     Pulse Temperature Height Pulse Oximetry BMI (Body Mass Index)       73 99.5  F (37.5  C) (Oral) 5' 11\" " (1.803 m) 97% 30.4 kg/m2        Blood Pressure from Last 3 Encounters:   11/27/17 106/70   05/25/17 130/80   03/19/13 112/74    Weight from Last 3 Encounters:   11/27/17 218 lb (98.9 kg)   05/25/17 218 lb (98.9 kg)   03/19/13 188 lb (85.3 kg)              We Performed the Following     Influenza A/B antigen     Rapid strep screen        Primary Care Provider Office Phone # Fax #    Cammy Castaneda [phone] [address] [facility] [address]        Equal Access to Services     [facility] [place] : Merritt Gage, neeru goyal, ophelia greene, mesha deng . So [facility] [phone].    ATENCIÓN: Si habla español, tiene a bonilla disposición servicios gratuitos de asistencia lingüística. Llame al [phone].    We comply with applicable federal civil rights laws and [place] laws. We do not discriminate on the basis of race, color, national origin, age, disability, sex, sexual orientation, or gender identity.            Thank you!     Thank you for choosing [facility]  for your care. Our goal is always to provide you with excellent care. Hearing back from our patients is one way we can continue to improve our services. Please take a few minutes to complete the written survey that you may receive in the mail after your visit with us. Thank you!             Your Updated Medication List - Protect others around you: Learn how to safely use, store and throw away your medicines at www.disposemymeds.org.      Notice  As of 11/27/2017 10:19 AM    You have not been prescribed any medications.       PAST MEDICAL HISTORY:  Giant cell tumor      (normal spontaneous vaginal delivery)

## 2023-11-30 ENCOUNTER — TRANSCRIPTION ENCOUNTER (OUTPATIENT)
Age: 48
End: 2023-11-30

## 2023-11-30 ENCOUNTER — APPOINTMENT (OUTPATIENT)
Dept: NUCLEAR MEDICINE | Facility: HOSPITAL | Age: 48
End: 2023-11-30

## 2023-11-30 ENCOUNTER — INPATIENT (INPATIENT)
Facility: HOSPITAL | Age: 48
LOS: 2 days | Discharge: HOME | End: 2023-12-03
Attending: SURGERY | Admitting: SURGERY
Payer: COMMERCIAL

## 2023-11-30 VITALS
OXYGEN SATURATION: 99 % | HEART RATE: 81 BPM | SYSTOLIC BLOOD PRESSURE: 114 MMHG | RESPIRATION RATE: 14 BRPM | WEIGHT: 179.9 LBS | TEMPERATURE: 98 F | HEIGHT: 70 IN | DIASTOLIC BLOOD PRESSURE: 71 MMHG

## 2023-11-30 DIAGNOSIS — Z98.890 OTHER SPECIFIED POSTPROCEDURAL STATES: Chronic | ICD-10-CM

## 2023-11-30 DIAGNOSIS — Z90.13 ACQUIRED ABSENCE OF BILATERAL BREASTS AND NIPPLES: ICD-10-CM

## 2023-11-30 LAB
ANION GAP SERPL CALC-SCNC: 11 MMOL/L — SIGNIFICANT CHANGE UP (ref 7–14)
ANION GAP SERPL CALC-SCNC: 11 MMOL/L — SIGNIFICANT CHANGE UP (ref 7–14)
BUN SERPL-MCNC: 9 MG/DL — SIGNIFICANT CHANGE UP (ref 7–23)
BUN SERPL-MCNC: 9 MG/DL — SIGNIFICANT CHANGE UP (ref 7–23)
CALCIUM SERPL-MCNC: 8.1 MG/DL — LOW (ref 8.4–10.5)
CALCIUM SERPL-MCNC: 8.1 MG/DL — LOW (ref 8.4–10.5)
CHLORIDE SERPL-SCNC: 104 MMOL/L — SIGNIFICANT CHANGE UP (ref 98–107)
CHLORIDE SERPL-SCNC: 104 MMOL/L — SIGNIFICANT CHANGE UP (ref 98–107)
CO2 SERPL-SCNC: 22 MMOL/L — SIGNIFICANT CHANGE UP (ref 22–31)
CO2 SERPL-SCNC: 22 MMOL/L — SIGNIFICANT CHANGE UP (ref 22–31)
CREAT SERPL-MCNC: 0.75 MG/DL — SIGNIFICANT CHANGE UP (ref 0.5–1.3)
CREAT SERPL-MCNC: 0.75 MG/DL — SIGNIFICANT CHANGE UP (ref 0.5–1.3)
EGFR: 100 ML/MIN/1.73M2 — SIGNIFICANT CHANGE UP
EGFR: 100 ML/MIN/1.73M2 — SIGNIFICANT CHANGE UP
GLUCOSE SERPL-MCNC: 165 MG/DL — HIGH (ref 70–99)
GLUCOSE SERPL-MCNC: 165 MG/DL — HIGH (ref 70–99)
HCG UR QL: NEGATIVE — SIGNIFICANT CHANGE UP
HCG UR QL: NEGATIVE — SIGNIFICANT CHANGE UP
HCT VFR BLD CALC: 28.4 % — LOW (ref 34.5–45)
HCT VFR BLD CALC: 28.4 % — LOW (ref 34.5–45)
HGB BLD-MCNC: 8.5 G/DL — LOW (ref 11.5–15.5)
HGB BLD-MCNC: 8.5 G/DL — LOW (ref 11.5–15.5)
MCHC RBC-ENTMCNC: 21 PG — LOW (ref 27–34)
MCHC RBC-ENTMCNC: 21 PG — LOW (ref 27–34)
MCHC RBC-ENTMCNC: 29.9 GM/DL — LOW (ref 32–36)
MCHC RBC-ENTMCNC: 29.9 GM/DL — LOW (ref 32–36)
MCV RBC AUTO: 70.1 FL — LOW (ref 80–100)
MCV RBC AUTO: 70.1 FL — LOW (ref 80–100)
NRBC # BLD: 0 /100 WBCS — SIGNIFICANT CHANGE UP (ref 0–0)
NRBC # BLD: 0 /100 WBCS — SIGNIFICANT CHANGE UP (ref 0–0)
NRBC # FLD: 0 K/UL — SIGNIFICANT CHANGE UP (ref 0–0)
NRBC # FLD: 0 K/UL — SIGNIFICANT CHANGE UP (ref 0–0)
PLATELET # BLD AUTO: 134 K/UL — LOW (ref 150–400)
PLATELET # BLD AUTO: 134 K/UL — LOW (ref 150–400)
POTASSIUM SERPL-MCNC: 3.9 MMOL/L — SIGNIFICANT CHANGE UP (ref 3.5–5.3)
POTASSIUM SERPL-MCNC: 3.9 MMOL/L — SIGNIFICANT CHANGE UP (ref 3.5–5.3)
POTASSIUM SERPL-SCNC: 3.9 MMOL/L — SIGNIFICANT CHANGE UP (ref 3.5–5.3)
POTASSIUM SERPL-SCNC: 3.9 MMOL/L — SIGNIFICANT CHANGE UP (ref 3.5–5.3)
RBC # BLD: 4.05 M/UL — SIGNIFICANT CHANGE UP (ref 3.8–5.2)
RBC # BLD: 4.05 M/UL — SIGNIFICANT CHANGE UP (ref 3.8–5.2)
RBC # FLD: 17.8 % — HIGH (ref 10.3–14.5)
RBC # FLD: 17.8 % — HIGH (ref 10.3–14.5)
SODIUM SERPL-SCNC: 137 MMOL/L — SIGNIFICANT CHANGE UP (ref 135–145)
SODIUM SERPL-SCNC: 137 MMOL/L — SIGNIFICANT CHANGE UP (ref 135–145)
WBC # BLD: 10.98 K/UL — HIGH (ref 3.8–10.5)
WBC # BLD: 10.98 K/UL — HIGH (ref 3.8–10.5)
WBC # FLD AUTO: 10.98 K/UL — HIGH (ref 3.8–10.5)
WBC # FLD AUTO: 10.98 K/UL — HIGH (ref 3.8–10.5)

## 2023-11-30 PROCEDURE — 88307 TISSUE EXAM BY PATHOLOGIST: CPT | Mod: 26

## 2023-11-30 PROCEDURE — 88305 TISSUE EXAM BY PATHOLOGIST: CPT | Mod: 26

## 2023-11-30 DEVICE — LIGATING CLIPS WECK HORIZON MEDIUM (BLUE) 24: Type: IMPLANTABLE DEVICE | Status: FUNCTIONAL

## 2023-11-30 DEVICE — CARTRIDGE MICROCLIP 30: Type: IMPLANTABLE DEVICE | Status: FUNCTIONAL

## 2023-11-30 DEVICE — LIGATING CLIPS WECK HORIZON SMALL-WIDE (RED) 24: Type: IMPLANTABLE DEVICE | Status: FUNCTIONAL

## 2023-11-30 DEVICE — COUPLER VESSEL ANASTOMOTIC 2.5MM: Type: IMPLANTABLE DEVICE | Status: FUNCTIONAL

## 2023-11-30 DEVICE — CLIP APPLIER COVIDIEN SURGICLIP III 9" SM: Type: IMPLANTABLE DEVICE | Status: FUNCTIONAL

## 2023-11-30 DEVICE — CLIP APPLIER COVIDIEN SURGICLIP 11.5" MEDIUM: Type: IMPLANTABLE DEVICE | Status: FUNCTIONAL

## 2023-11-30 DEVICE — GRAFT NERVE CONNECTOR 2X10MM: Type: IMPLANTABLE DEVICE | Status: FUNCTIONAL

## 2023-11-30 RX ORDER — ONDANSETRON 8 MG/1
4 TABLET, FILM COATED ORAL ONCE
Refills: 0 | Status: DISCONTINUED | OUTPATIENT
Start: 2023-11-30 | End: 2023-12-01

## 2023-11-30 RX ORDER — ENOXAPARIN SODIUM 100 MG/ML
40 INJECTION SUBCUTANEOUS EVERY 24 HOURS
Refills: 0 | Status: DISCONTINUED | OUTPATIENT
Start: 2023-12-01 | End: 2023-12-03

## 2023-11-30 RX ORDER — HYDROMORPHONE HYDROCHLORIDE 2 MG/ML
0.5 INJECTION INTRAMUSCULAR; INTRAVENOUS; SUBCUTANEOUS
Refills: 0 | Status: DISCONTINUED | OUTPATIENT
Start: 2023-11-30 | End: 2023-12-01

## 2023-11-30 RX ORDER — SODIUM CHLORIDE 9 MG/ML
1000 INJECTION, SOLUTION INTRAVENOUS
Refills: 0 | Status: DISCONTINUED | OUTPATIENT
Start: 2023-11-30 | End: 2023-12-01

## 2023-11-30 RX ORDER — OXYCODONE HYDROCHLORIDE 5 MG/1
10 TABLET ORAL EVERY 4 HOURS
Refills: 0 | Status: DISCONTINUED | OUTPATIENT
Start: 2023-11-30 | End: 2023-12-03

## 2023-11-30 RX ORDER — KETOROLAC TROMETHAMINE 30 MG/ML
15 SYRINGE (ML) INJECTION EVERY 6 HOURS
Refills: 0 | Status: DISCONTINUED | OUTPATIENT
Start: 2023-11-30 | End: 2023-12-01

## 2023-11-30 RX ORDER — SENNA PLUS 8.6 MG/1
2 TABLET ORAL AT BEDTIME
Refills: 0 | Status: DISCONTINUED | OUTPATIENT
Start: 2023-11-30 | End: 2023-12-03

## 2023-11-30 RX ORDER — ACETAMINOPHEN 500 MG
1000 TABLET ORAL EVERY 6 HOURS
Refills: 0 | Status: COMPLETED | OUTPATIENT
Start: 2023-11-30 | End: 2023-12-01

## 2023-11-30 RX ORDER — IBUPROFEN 200 MG
400 TABLET ORAL EVERY 6 HOURS
Refills: 0 | Status: COMPLETED | OUTPATIENT
Start: 2023-11-30 | End: 2024-10-28

## 2023-11-30 RX ORDER — OXYCODONE HYDROCHLORIDE 5 MG/1
5 TABLET ORAL EVERY 4 HOURS
Refills: 0 | Status: DISCONTINUED | OUTPATIENT
Start: 2023-11-30 | End: 2023-12-03

## 2023-11-30 RX ORDER — PANTOPRAZOLE SODIUM 20 MG/1
40 TABLET, DELAYED RELEASE ORAL DAILY
Refills: 0 | Status: DISCONTINUED | OUTPATIENT
Start: 2023-11-30 | End: 2023-12-03

## 2023-11-30 RX ORDER — ONDANSETRON 8 MG/1
4 TABLET, FILM COATED ORAL EVERY 6 HOURS
Refills: 0 | Status: DISCONTINUED | OUTPATIENT
Start: 2023-11-30 | End: 2023-12-03

## 2023-11-30 RX ORDER — METOCLOPRAMIDE HCL 10 MG
10 TABLET ORAL EVERY 8 HOURS
Refills: 0 | Status: DISCONTINUED | OUTPATIENT
Start: 2023-11-30 | End: 2023-12-03

## 2023-11-30 RX ORDER — DIPHENHYDRAMINE HCL 50 MG
25 CAPSULE ORAL EVERY 4 HOURS
Refills: 0 | Status: DISCONTINUED | OUTPATIENT
Start: 2023-11-30 | End: 2023-12-03

## 2023-11-30 RX ORDER — CALCIUM CARBONATE 500(1250)
1 TABLET ORAL EVERY 4 HOURS
Refills: 0 | Status: DISCONTINUED | OUTPATIENT
Start: 2023-11-30 | End: 2023-12-03

## 2023-11-30 RX ORDER — CEFAZOLIN SODIUM 1 G
2000 VIAL (EA) INJECTION EVERY 8 HOURS
Refills: 0 | Status: COMPLETED | OUTPATIENT
Start: 2023-11-30 | End: 2023-12-01

## 2023-11-30 RX ADMIN — SODIUM CHLORIDE 125 MILLILITER(S): 9 INJECTION, SOLUTION INTRAVENOUS at 16:58

## 2023-11-30 RX ADMIN — Medication 100 MILLIGRAM(S): at 23:29

## 2023-11-30 RX ADMIN — Medication 1000 MILLIGRAM(S): at 22:21

## 2023-11-30 RX ADMIN — HYDROMORPHONE HYDROCHLORIDE 0.5 MILLIGRAM(S): 2 INJECTION INTRAMUSCULAR; INTRAVENOUS; SUBCUTANEOUS at 18:20

## 2023-11-30 RX ADMIN — Medication 400 MILLIGRAM(S): at 22:01

## 2023-11-30 RX ADMIN — Medication 15 MILLIGRAM(S): at 18:39

## 2023-11-30 RX ADMIN — Medication 15 MILLIGRAM(S): at 18:45

## 2023-11-30 RX ADMIN — HYDROMORPHONE HYDROCHLORIDE 0.5 MILLIGRAM(S): 2 INJECTION INTRAMUSCULAR; INTRAVENOUS; SUBCUTANEOUS at 17:58

## 2023-11-30 RX ADMIN — SENNA PLUS 2 TABLET(S): 8.6 TABLET ORAL at 22:01

## 2023-11-30 NOTE — BRIEF OPERATIVE NOTE - NSICDXBRIEFPREOP_GEN_ALL_CORE_FT
PRE-OP DIAGNOSIS:  Intraductal papilloma of breast 30-Nov-2023 13:40:18  Sindy Yen  
PRE-OP DIAGNOSIS:  Intraductal papilloma of breast 30-Nov-2023 13:40:18  Sindy Yen

## 2023-11-30 NOTE — ASU PREOP CHECKLIST - SITE MARKED BY SURGEON
----- Message from Mey Mcmillan sent at 2/26/2020 10:29 AM CST -----  Christina -- pt is returning your call.  She stated her wound is infected.  Call back # [phone]     [facility] [address] [place] [address] ([place], [place] - [address]  [address]  [place] ([place] [address]  Phone: [phone] Fax: [phone]        
Left message to return call regarding concerns. Informed patient that I sent a message through her My [facility] portal if communication is easier for her that way.   
yes

## 2023-11-30 NOTE — BRIEF OPERATIVE NOTE - NSICDXBRIEFPOSTOP_GEN_ALL_CORE_FT
POST-OP DIAGNOSIS:  Intraductal papilloma of breast 30-Nov-2023 13:41:01  Sindy Yen  
POST-OP DIAGNOSIS:  Intraductal papilloma of breast 30-Nov-2023 13:41:01  Sindy Yen

## 2023-11-30 NOTE — DISCHARGE NOTE PROVIDER - NSDCCPTREATMENT_GEN_ALL_CORE_FT
PRINCIPAL PROCEDURE  Procedure: Mastectomy, bilateral, with bilateral reconstruction using JEROMY flap  Findings and Treatment:

## 2023-11-30 NOTE — BRIEF OPERATIVE NOTE - OPERATION/FINDINGS
Bilateral nipple sparing mastectomy, with bilateral lymph node biopsy , 3 nodes from left axilla negative for carcinoma, 2 nodes from right axilla negative for carcinoma, JEROMY flap reconstruction with PRS (please refer to PRS brief note)
Bilateral JEROMY flap reconstruction with nerve repairs

## 2023-11-30 NOTE — DISCHARGE NOTE PROVIDER - NSDCFUSCHEDAPPT_GEN_ALL_CORE_FT
Osman Cerna  [facility]  OBGYNGEN [address]  Scheduled Appointment: 01/08/2024     Osman Cerna  [facility]  OBGYNGEN [address]  Scheduled Appointment: 01/08/2024     Osman Cerna  [facility]  OBGYNGEN [address]  Scheduled Appointment: 01/08/2024

## 2023-11-30 NOTE — CHART NOTE - NSCHARTNOTEFT_GEN_A_CORE
[facility] Operative Note  Patient: PEREZ TOSCANO 47y (1975) Female   MRN: [id number]  Location: [facility] 24  Visit: 11-30-23 Inpatient    Procedure: S/P b/l nipple sparing mastectomy, JEROMY flap reconstruction    Subjective: Pt feeling well, pain controlled. Denies nausea/SOB/chest pain.       Objective:  Vitals: T(F): 98 (12-01-23 @ 02:00), Max: 98.4 (11-30-23 @ 06:55)  HR: 62 (12-01-23 @ 04:00)  BP: 103/61 (12-01-23 @ 04:00) (102/63 - 119/63)  RR: 12 (12-01-23 @ 04:00)  SpO2: 100% (12-01-23 @ 04:00)  Vent Settings:     In:   11-30-23 @ 07:01  -  12-01-23 @ 04:32  --------------------------------------------------------  IN: 1525 mL      IV Fluids: lactated ringers. 1000 milliLiter(s) (125 mL/Hr) IV Continuous <Continuous>  lactated ringers. 1000 milliLiter(s) (30 mL/Hr) IV Continuous <Continuous>      Out:   11-30-23 @ 07:01  -  12-01-23 @ 04:32  --------------------------------------------------------  OUT: 1205 mL      EBL:     Voided Urine:   11-30-23 @ 07:01  -  12-01-23 @ 04:32  --------------------------------------------------------  OUT: 1205 mL      Beck Catheter: yes   Drains:   KERRY:   11-30-23 @ 07:01  -  12-01-23 @ 04:32  --------------------------------------------------------  OUT: 205 mL     ,   Chest Tube:      NG Tube:         Physical Examination:  -- CONSTITUTIONAL: AOx3. NAD.   -- RIGHT BREAST / FLAP: Mastectomy skin flap ecchymosis, stable. No collections. Flap soft and pink with 2-3 second capillary refill. Vioptix: 85%. Drains serosanguinous.  -- LEFT BREAST / FLAP: Mastectomy skin flap ecchymosis, stable. No collections. Flap soft and pink with 2-3 second capillary refill. Vioptix: 99%. Drains  serosanguinous.  -- CARDIOVASCULAR: Regular rate and rhythm. S1, S2.  -- RESPIRATORY: Bilateral breath sounds.   -- ABDOMEN: Soft. No collections. Incision intact. Umbilicus viable. Drains serosanguinous.      Imaging:  No post-op imaging studies    Assessment:  47yFemale patient S/P bilateral nipple sparing mastectomy, JEROMY flap reconstruction.    Plan:  - IV Abx: Ancef x24hrs  - Pain control PRN  - Diet: NPO POD0, regular diet POD1  - Activity: OOBTC with assistance on POD1  - DVT ppx: Lovenox  - Beck out POD1  - Appreciate care per plastics    Date/Time: 12-01-23 @ 04:32 WDL

## 2023-11-30 NOTE — DISCHARGE NOTE PROVIDER - NSDCFUADDINST_GEN_ALL_CORE_FT
Activity:  - Rest at home during the first few days after surgery.  - Walking is encouraged, but strenuous exercise is not allowed until 6 weeks after surgery.   - Avoid strenuous activity. Do not lift your arms above your head. Do not lift more than 5-10  pounds.    Sleep:  Sleep on your back for the first two weeks after surgery.    Showering:  - You may take sponge baths following discharge. Pat dry. We will instruct you to shower once you  have drains removed from your breasts in the office.  - Do not take a bath or submerge yourself in water.  - You will have special adhesive glue or tape over the incisions. Do not take these off.     For the Breast:  You have just undergone a breast reconstruction with the JEROMY flap. Your breast will likely have bruising  and possibly some blistering on the skin, which is expected after a mastectomy. You have a small patch  of skin on your breasts, which is a different color than the surrounding breast skin. This paddle of skin  comes from the abdomen and is an indicator of how the flap is doing. It is important to check this skin  paddle daily. The skin should remain the same color. If the color of the small patch changes (i.e blue,  purple, pale), please call the office immediately.    For the Abdomen:  Your incision and belly button are covered in a special medical grade sealant, which will come off in the  office, 2-3 weeks after surgery.    Drains:  Both the breast and abdomen will have drains. It is important to empty the drains twice daily and  record the outputs. Please bring this sheet to your appointment after surgery. Based on the output, the  drains will be removed 1 to 3 weeks after surgery. For the drains to be working appropriately, the bulbs  need to be collapsed to create a light suction. The nurse in the hospital will review the drain care with  you and your family prior to discharge home. It is best to safely secure the drains to your clothes with a  safety pin.     In an effort to make you more comfortable with discharge home and to answer any questions you may  have, you may call the office at any time with additional questions or concerns.    Call the Office:  Do not hesitate to call the office with any concerns or questions. A doctor is available to answer your  questions 24 hours a day. Please notify us immediately if:  1. You have increased swelling, pain, or color change in the breast.  2. One breast becomes suddenly significantly larger than the other breast.  3. You have a sudden increase in swelling of the abdomen.  4. You have redness develop around the incisions.  5. You have a fever greater than 101 F.  6. You develop sudden increase in pain.  7. You develop drainage, spreading redness or foul odor  8. You have any questions.

## 2023-11-30 NOTE — BRIEF OPERATIVE NOTE - NSICDXBRIEFPROCEDURE_GEN_ALL_CORE_FT
PROCEDURES:  Mastectomy, total, bilateral 30-Nov-2023 13:39:21  Sindy Yen  
PROCEDURES:  JEROMY flap, free 30-Nov-2023 16:40:53  Al Agudelo

## 2023-11-30 NOTE — DISCHARGE NOTE PROVIDER - HOSPITAL COURSE
Margot is a 47yoF who underwent bilateral mastectomies, SLNB, and JEROMY reconstruction in the OR on 11/30/23. The patient tolerated the procedure well. Post-operatively the patient was sent to the PACU. The patient was hemodynamically stable and was transferred to a surgical floor. Patient tolerated operation well and there were no post operative complications identified. The patient's pain was controlled by IV pain medications and then by PO pain medications. The patient was advanced to a regular diet and tolerated it well. The patient was placed on home medications. At the time of discharge, the patient was hemodynamically stable, was tolerating PO diet, was voiding urine and passing stool, was ambulating, and was comfortable with adequate pain control. The patient was instructed to follow up with Dr. Ghosh within 1 week after discharge from the hospital. The patient/family felt comfortable with discharge. The patient was discharged home with a prescription for _. The patient had no other issues. Margot is a 47yoF who underwent bilateral mastectomies, SLNB, and JEROMY reconstruction in the OR on 11/30/23. The patient tolerated the procedure well. Post-operatively the patient was sent to the PACU. The patient was hemodynamically stable and was transferred to a surgical floor. Patient tolerated operation well and there were no post operative complications identified. The patient's pain was controlled by IV pain medications and then by PO pain medications. The patient was advanced to a regular diet and tolerated it well. The patient was placed on home medications. At the time of discharge, the patient was hemodynamically stable, was tolerating PO diet, was voiding urine and passing stool, was ambulating, and was comfortable with adequate pain control. The patient was instructed to follow up with Dr. Ghosh within 1 week after discharge from the hospital. The patient/family felt comfortable with discharge. The patient was previously provided with prescriptions by the outpatient clinic and is to take these prescriptions as instructed. The patient had no other issues.

## 2023-11-30 NOTE — DISCHARGE NOTE PROVIDER - NSDCMRMEDTOKEN_GEN_ALL_CORE_FT
Colace stool softener 1 cap daily:   Magnesium 1 cap daily PRN:    acetaminophen 325 mg oral tablet: 3 tab(s) orally every 8 hours  Colace stool softener 1 cap daily:   ibuprofen 400 mg oral tablet: 1 tab(s) orally every 6 hours  Magnesium 1 cap daily PRN:

## 2023-11-30 NOTE — DISCHARGE NOTE PROVIDER - CARE PROVIDER_API CALL
Rocky Ghosh  Plastic Surgery  [address], [address]  [place] [address]  Phone: [phone]  Fax: [phone]  Follow Up Time: 1 week   Rocky Ghosh  Plastic Surgery  [address], [address]  [place] [address]  Phone: [phone]  Fax: [phone]  Follow Up Time: 1 week   Rocky Ghosh  Plastic Surgery  [address], [address]  [place] [address]  Phone: [phone]  Fax: [phone]  Follow Up Time: 1 week

## 2023-11-30 NOTE — DISCHARGE NOTE PROVIDER - NSDCCPCAREPLAN_GEN_ALL_CORE_FT
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

## 2023-12-01 ENCOUNTER — TRANSCRIPTION ENCOUNTER (OUTPATIENT)
Age: 48
End: 2023-12-01

## 2023-12-01 LAB
ANION GAP SERPL CALC-SCNC: 8 MMOL/L — SIGNIFICANT CHANGE UP (ref 7–14)
ANION GAP SERPL CALC-SCNC: 8 MMOL/L — SIGNIFICANT CHANGE UP (ref 7–14)
BUN SERPL-MCNC: 8 MG/DL — SIGNIFICANT CHANGE UP (ref 7–23)
BUN SERPL-MCNC: 8 MG/DL — SIGNIFICANT CHANGE UP (ref 7–23)
CALCIUM SERPL-MCNC: 8.4 MG/DL — SIGNIFICANT CHANGE UP (ref 8.4–10.5)
CALCIUM SERPL-MCNC: 8.4 MG/DL — SIGNIFICANT CHANGE UP (ref 8.4–10.5)
CHLORIDE SERPL-SCNC: 106 MMOL/L — SIGNIFICANT CHANGE UP (ref 98–107)
CHLORIDE SERPL-SCNC: 106 MMOL/L — SIGNIFICANT CHANGE UP (ref 98–107)
CO2 SERPL-SCNC: 24 MMOL/L — SIGNIFICANT CHANGE UP (ref 22–31)
CO2 SERPL-SCNC: 24 MMOL/L — SIGNIFICANT CHANGE UP (ref 22–31)
CREAT SERPL-MCNC: 0.69 MG/DL — SIGNIFICANT CHANGE UP (ref 0.5–1.3)
CREAT SERPL-MCNC: 0.69 MG/DL — SIGNIFICANT CHANGE UP (ref 0.5–1.3)
EGFR: 109 ML/MIN/1.73M2 — SIGNIFICANT CHANGE UP
EGFR: 109 ML/MIN/1.73M2 — SIGNIFICANT CHANGE UP
GLUCOSE SERPL-MCNC: 108 MG/DL — HIGH (ref 70–99)
GLUCOSE SERPL-MCNC: 108 MG/DL — HIGH (ref 70–99)
HCT VFR BLD CALC: 26.1 % — LOW (ref 34.5–45)
HCT VFR BLD CALC: 26.1 % — LOW (ref 34.5–45)
HGB BLD-MCNC: 8.1 G/DL — LOW (ref 11.5–15.5)
HGB BLD-MCNC: 8.1 G/DL — LOW (ref 11.5–15.5)
MCHC RBC-ENTMCNC: 21.4 PG — LOW (ref 27–34)
MCHC RBC-ENTMCNC: 21.4 PG — LOW (ref 27–34)
MCHC RBC-ENTMCNC: 31 GM/DL — LOW (ref 32–36)
MCHC RBC-ENTMCNC: 31 GM/DL — LOW (ref 32–36)
MCV RBC AUTO: 69 FL — LOW (ref 80–100)
MCV RBC AUTO: 69 FL — LOW (ref 80–100)
NRBC # BLD: 0 /100 WBCS — SIGNIFICANT CHANGE UP (ref 0–0)
NRBC # BLD: 0 /100 WBCS — SIGNIFICANT CHANGE UP (ref 0–0)
NRBC # FLD: 0 K/UL — SIGNIFICANT CHANGE UP (ref 0–0)
NRBC # FLD: 0 K/UL — SIGNIFICANT CHANGE UP (ref 0–0)
PLATELET # BLD AUTO: 131 K/UL — LOW (ref 150–400)
PLATELET # BLD AUTO: 131 K/UL — LOW (ref 150–400)
POTASSIUM SERPL-MCNC: 4.2 MMOL/L — SIGNIFICANT CHANGE UP (ref 3.5–5.3)
POTASSIUM SERPL-MCNC: 4.2 MMOL/L — SIGNIFICANT CHANGE UP (ref 3.5–5.3)
POTASSIUM SERPL-SCNC: 4.2 MMOL/L — SIGNIFICANT CHANGE UP (ref 3.5–5.3)
POTASSIUM SERPL-SCNC: 4.2 MMOL/L — SIGNIFICANT CHANGE UP (ref 3.5–5.3)
RBC # BLD: 3.78 M/UL — LOW (ref 3.8–5.2)
RBC # BLD: 3.78 M/UL — LOW (ref 3.8–5.2)
RBC # FLD: 17.9 % — HIGH (ref 10.3–14.5)
RBC # FLD: 17.9 % — HIGH (ref 10.3–14.5)
SODIUM SERPL-SCNC: 138 MMOL/L — SIGNIFICANT CHANGE UP (ref 135–145)
SODIUM SERPL-SCNC: 138 MMOL/L — SIGNIFICANT CHANGE UP (ref 135–145)
WBC # BLD: 8.06 K/UL — SIGNIFICANT CHANGE UP (ref 3.8–10.5)
WBC # BLD: 8.06 K/UL — SIGNIFICANT CHANGE UP (ref 3.8–10.5)
WBC # FLD AUTO: 8.06 K/UL — SIGNIFICANT CHANGE UP (ref 3.8–10.5)
WBC # FLD AUTO: 8.06 K/UL — SIGNIFICANT CHANGE UP (ref 3.8–10.5)

## 2023-12-01 RX ORDER — IBUPROFEN 200 MG
400 TABLET ORAL EVERY 6 HOURS
Refills: 0 | Status: DISCONTINUED | OUTPATIENT
Start: 2023-12-01 | End: 2023-12-03

## 2023-12-01 RX ORDER — INFLUENZA VIRUS VACCINE 15; 15; 15; 15 UG/.5ML; UG/.5ML; UG/.5ML; UG/.5ML
0.5 SUSPENSION INTRAMUSCULAR ONCE
Refills: 0 | Status: COMPLETED | OUTPATIENT
Start: 2023-12-01 | End: 2023-12-01

## 2023-12-01 RX ORDER — DOCUSATE SODIUM 100 MG
100 CAPSULE ORAL THREE TIMES A DAY
Refills: 0 | Status: DISCONTINUED | OUTPATIENT
Start: 2023-12-01 | End: 2023-12-03

## 2023-12-01 RX ADMIN — Medication 1000 MILLIGRAM(S): at 11:00

## 2023-12-01 RX ADMIN — Medication 15 MILLIGRAM(S): at 00:27

## 2023-12-01 RX ADMIN — Medication 400 MILLIGRAM(S): at 22:06

## 2023-12-01 RX ADMIN — PANTOPRAZOLE SODIUM 40 MILLIGRAM(S): 20 TABLET, DELAYED RELEASE ORAL at 14:14

## 2023-12-01 RX ADMIN — OXYCODONE HYDROCHLORIDE 5 MILLIGRAM(S): 5 TABLET ORAL at 22:05

## 2023-12-01 RX ADMIN — Medication 100 MILLIGRAM(S): at 16:52

## 2023-12-01 RX ADMIN — Medication 400 MILLIGRAM(S): at 17:35

## 2023-12-01 RX ADMIN — SENNA PLUS 2 TABLET(S): 8.6 TABLET ORAL at 21:47

## 2023-12-01 RX ADMIN — OXYCODONE HYDROCHLORIDE 5 MILLIGRAM(S): 5 TABLET ORAL at 14:20

## 2023-12-01 RX ADMIN — Medication 400 MILLIGRAM(S): at 23:03

## 2023-12-01 RX ADMIN — ENOXAPARIN SODIUM 40 MILLIGRAM(S): 100 INJECTION SUBCUTANEOUS at 07:52

## 2023-12-01 RX ADMIN — Medication 1000 MILLIGRAM(S): at 05:01

## 2023-12-01 RX ADMIN — Medication 400 MILLIGRAM(S): at 10:34

## 2023-12-01 RX ADMIN — Medication 100 MILLIGRAM(S): at 07:33

## 2023-12-01 RX ADMIN — OXYCODONE HYDROCHLORIDE 5 MILLIGRAM(S): 5 TABLET ORAL at 23:03

## 2023-12-01 RX ADMIN — Medication 15 MILLIGRAM(S): at 00:57

## 2023-12-01 RX ADMIN — OXYCODONE HYDROCHLORIDE 5 MILLIGRAM(S): 5 TABLET ORAL at 15:00

## 2023-12-01 RX ADMIN — Medication 400 MILLIGRAM(S): at 04:08

## 2023-12-01 NOTE — PROGRESS NOTE ADULT - SUBJECTIVE AND OBJECTIVE BOX
[facility] Plastic Surgery Daily Progress Note  =====================================================    SUBJECTIVE: Patient seen and examined at bedside on AM rounds. Patient reports that they're feeling well.      ALLERGIES:  No Known Allergies      --------------------------------------------------------------------------------------    MEDICATIONS:    Neurologic Medications  acetaminophen   IVPB .. 1000 milliGRAM(s) IV Intermittent every 6 hours  HYDROmorphone  Injectable 0.5 milliGRAM(s) IV Push every 10 minutes PRN Moderate Pain (4 - 6)  ibuprofen  Tablet. 400 milliGRAM(s) Oral every 6 hours  metoclopramide Injectable 10 milliGRAM(s) IV Push every 8 hours PRN Nausea and/or Vomiting  ondansetron Injectable 4 milliGRAM(s) IV Push every 6 hours PRN Nausea and/or Vomiting  ondansetron Injectable 4 milliGRAM(s) IV Push once PRN Nausea and/or Vomiting  oxyCODONE    IR 10 milliGRAM(s) Oral every 4 hours PRN Severe Pain (7 - 10)  oxyCODONE    IR 5 milliGRAM(s) Oral every 4 hours PRN Moderate Pain (4 - 6)    Respiratory Medications  diphenhydrAMINE Injectable 25 milliGRAM(s) IV Push every 4 hours PRN Itching    Cardiovascular Medications    Gastrointestinal Medications  calcium carbonate    500 mG (Tums) Chewable 1 Tablet(s) Chew every 4 hours PRN Dyspepsia  pantoprazole  Injectable 40 milliGRAM(s) IV Push daily  senna 2 Tablet(s) Oral at bedtime    Genitourinary Medications    Hematologic/Oncologic Medications  enoxaparin Injectable 40 milliGRAM(s) SubCutaneous every 24 hours  influenza   Vaccine 0.5 milliLiter(s) IntraMuscular once    Antimicrobial/Immunologic Medications    Endocrine/Metabolic Medications    Topical/Other Medications    --------------------------------------------------------------------------------------    VITAL SIGNS:  Vital Signs Last 24 Hrs  T(C): 36.7 (01 Dec 2023 07:00), Max: 36.7 (30 Nov 2023 18:00)  T(F): 98.1 (01 Dec 2023 07:00), Max: 98.1 (30 Nov 2023 18:00)  HR: 60 (01 Dec 2023 07:00) (60 - 91)  BP: 99/62 (01 Dec 2023 07:00) (99/62 - 119/63)  BP(mean): 71 (01 Dec 2023 07:00) (66 - 77)  ABP: --  ABP(mean): --  RR: 13 (01 Dec 2023 07:00) (11 - 17)  SpO2: 100% (01 Dec 2023 07:00) (96% - 100%)    O2 Parameters below as of 01 Dec 2023 04:00  Patient On (Oxygen Delivery Method): nasal cannula  O2 Flow (L/min): 4        --------------------------------------------------------------------------------------    EXAM    PHYSICAL EXAM    -- CONSTITUTIONAL: NAD, lying in bed  -- NEURO: Awake, alert  Breast:  L  soft, non-tender  Flap viable, well-perfused, appropriate color, with 2-3 second capillary refill  No palpable fluid collections  vioptix signal stable  jps s/s  R   soft, non-tender  Flap viable, well-perfused, appropriate color, with 2-3 second capillary refill  No palpable fluid collections  vioptix signal stable  jps s/s  -- PULM: Non-labored respirations  -- ABDOMEN: Incision c/d/i, no palpable fluid collection  jps s/s     --------------------------------------------------------------------------------------    LABS                          8.1    8.06  )-----------( 131      ( 01 Dec 2023 05:45 )             26.1   12-01    138  |  106  |  8   ----------------------------<  108<H>  4.2   |  24  |  0.69    Ca    8.4      01 Dec 2023 05:45        --------------------------------------------------------------------------------------    INS AND OUTS:    I&O's Detail    30 Nov 2023 07:01  -  01 Dec 2023 07:00  --------------------------------------------------------  IN:    IV PiggyBack: 150 mL    Lactated Ringers: 1500 mL  Total IN: 1650 mL    OUT:    Bulb (mL): 32 mL    Bulb (mL): 33 mL    Bulb (mL): 29 mL    Bulb (mL): 9 mL    Bulb (mL): 39 mL    Bulb (mL): 63 mL    Indwelling Catheter - Urethral (mL): 1510 mL  Total OUT: 1715 mL    Total NET: -65 mL    --------------------------------------------------------------------------------------

## 2023-12-01 NOTE — DISCHARGE NOTE NURSING/CASE MANAGEMENT/SOCIAL WORK - NSSCNAMETXT_GEN_ALL_CORE
[facility].   Nurse to call and visit day after discharge  [facility].   Nurse to call and visit day after discharge  [facility].   Nurse to call and visit day after discharge

## 2023-12-01 NOTE — DISCHARGE NOTE NURSING/CASE MANAGEMENT/SOCIAL WORK - NSDCPEFALRISK_GEN_ALL_CORE
For information on Fall & Injury Prevention, visit: [email or web address]-[email or web address]-mobility OR  [email or web address]://[email or web address]/[email or web address]-tips-[email or web address]-injury OR  https://www.cdc.gov/steadi/patient.html For information on Fall & Injury Prevention, visit: [email or web address]://www.[email or web address]/[email or web address]-prevention-protects-and-maintains-health-and-mobility OR  [email or web address]://[email or web address].[facility]/news/fall-prevention-tips-to-avoid-injury OR  https://www.cdc.gov/steadi/patient.html For information on Fall & Injury Prevention, visit: https://www.[facility]/news/fall-prevention-protects-and-maintains-health-and-mobility OR  https://www.[facility]/news/fall-prevention-tips-to-avoid-injury OR  https://www.cdc.gov/steadi/patient.html

## 2023-12-01 NOTE — PROGRESS NOTE ADULT - ASSESSMENT
Margot is a 46yo F s/p BL mastectomy with JEROMY free flap reconstruction on 11/30. Patient is recovering well. Will plan to proceed with POD1 orders.      Plan  - Advance to q2 flap checks.  - AM labs wnl  - Will monitor drains   - 2L NC today  - Keep HOB >30 degrees   - Advance diet  - C/w vioptix  - Remove angela  - OOB to chair by midday, walking by afternoon  - IS   - Pain management IV tylenol, and tordol.  Oxy available PRN     [place]  Plastic Surgery  #[id number] [facility] pager  [phone] [facility] pager  Available on teams

## 2023-12-01 NOTE — DISCHARGE NOTE NURSING/CASE MANAGEMENT/SOCIAL WORK - NSDCPNINST_GEN_ALL_CORE
patient given supplies. call md for sign of infection (temp greater than 100.4f, redness at incision, pain not relieved by meds). call md for follow up appt. drink 9-13 eight oz. glasses of fluid daily.

## 2023-12-01 NOTE — DISCHARGE NOTE NURSING/CASE MANAGEMENT/SOCIAL WORK - NSDCPECAREGIVERED_GEN_ALL_CORE
pt ambulating, eating, voiding without difficulty. iv discontinued. no distress noted. bilateral breast incisions without sign of infection, 6 jessica drain sites, umbilical incision, and lower transverse abdomen intact and dry without sign of infection. jessica drains x 6 emptied. bilateral breast flaps viable. patient given supplies./No

## 2023-12-01 NOTE — PROGRESS NOTE ADULT - SUBJECTIVE AND OBJECTIVE BOX
Surgery Daily Progress Note  =====================================================  SUBJECTIVE: A 47y Female Patient seen and examined at bedside on AM rounds.     ALLERGIES:  No Known Allergies      --------------------------------------------------------------------------------------    MEDICATIONS:    Neurologic Medications  acetaminophen   IVPB .. 1000 milliGRAM(s) IV Intermittent every 6 hours  HYDROmorphone  Injectable 0.5 milliGRAM(s) IV Push every 10 minutes PRN Moderate Pain (4 - 6)  ibuprofen  Tablet. 400 milliGRAM(s) Oral every 6 hours  ketorolac   Injectable 15 milliGRAM(s) IV Push every 6 hours  metoclopramide Injectable 10 milliGRAM(s) IV Push every 8 hours PRN Nausea and/or Vomiting  ondansetron Injectable 4 milliGRAM(s) IV Push once PRN Nausea and/or Vomiting  ondansetron Injectable 4 milliGRAM(s) IV Push every 6 hours PRN Nausea and/or Vomiting  oxyCODONE    IR 10 milliGRAM(s) Oral every 4 hours PRN Severe Pain (7 - 10)  oxyCODONE    IR 5 milliGRAM(s) Oral every 4 hours PRN Moderate Pain (4 - 6)    Respiratory Medications  diphenhydrAMINE Injectable 25 milliGRAM(s) IV Push every 4 hours PRN Itching    Cardiovascular Medications    Gastrointestinal Medications  calcium carbonate    500 mG (Tums) Chewable 1 Tablet(s) Chew every 4 hours PRN Dyspepsia  lactated ringers. 1000 milliLiter(s) IV Continuous <Continuous>  lactated ringers. 1000 milliLiter(s) IV Continuous <Continuous>  pantoprazole  Injectable 40 milliGRAM(s) IV Push daily  senna 2 Tablet(s) Oral at bedtime    Genitourinary Medications    Hematologic/Oncologic Medications  enoxaparin Injectable 40 milliGRAM(s) SubCutaneous every 24 hours    Antimicrobial/Immunologic Medications  ceFAZolin   IVPB 2000 milliGRAM(s) IV Intermittent every 8 hours    Endocrine/Metabolic Medications    Topical/Other Medications    --------------------------------------------------------------------------------------    VITAL SIGNS:  No Known Allergies      T(C): 36.7 (12-01-23 @ 02:00), Max: 36.9 (11-30-23 @ 06:55)  HR: 62 (12-01-23 @ 05:00) (62 - 91)  BP: 102/55 (12-01-23 @ 05:00) (102/55 - 119/63)  RR: 14 (12-01-23 @ 05:00) (11 - 17)  SpO2: 100% (12-01-23 @ 05:00) (96% - 100%)  --------------------------------------------------------------------------------------    EXAM    General: NAD, resting in bed comfortably.  Cardiac: regular rate, warm and well perfused  Respiratory: Nonlabored respirations, normal cw expansion.  Abdomen: soft, nontender, nondistended. ___ incision is c/d/i, ostomy, NGT, julio césar.   Extremities: normal strength, FROM, no deformities    --------------------------------------------------------------------------------------    I&Os  T(C): 36.7 (12-01-23 @ 02:00), Max: 36.9 (11-30-23 @ 06:55)  HR: 62 (12-01-23 @ 05:00) (62 - 91)  BP: 102/55 (12-01-23 @ 05:00) (102/55 - 119/63)  RR: 14 (12-01-23 @ 05:00) (11 - 17)  SpO2: 100% (12-01-23 @ 05:00) (96% - 100%)  --------------------------------------------------------------------------------------    LABS                          8.5    10.98 )-----------( 134      ( 30 Nov 2023 16:45 )             28.4     11-30    137  |  104  |  9   ----------------------------<  165<H>  3.9   |  22  |  0.75    Ca    8.1<L>      30 Nov 2023 16:45        Urinalysis Basic - ( 30 Nov 2023 16:45 )    Color: x / Appearance: x / SG: x / pH: x  Gluc: 165 mg/dL / Ketone: x  / Bili: x / Urobili: x   Blood: x / Protein: x / Nitrite: x   Leuk Esterase: x / RBC: x / WBC x   Sq Epi: x / Non Sq Epi: x / Bacteria: x        11-30-23 @ 07:01  -  12-01-23 @ 05:41  --------------------------------------------------------  IN: 1525 mL / OUT: 1305 mL / NET: 220 mL      acetaminophen   IVPB .. 1000 milliGRAM(s) IV Intermittent every 6 hours  calcium carbonate    500 mG (Tums) Chewable 1 Tablet(s) Chew every 4 hours PRN  ceFAZolin   IVPB 2000 milliGRAM(s) IV Intermittent every 8 hours  diphenhydrAMINE Injectable 25 milliGRAM(s) IV Push every 4 hours PRN  enoxaparin Injectable 40 milliGRAM(s) SubCutaneous every 24 hours  HYDROmorphone  Injectable 0.5 milliGRAM(s) IV Push every 10 minutes PRN  ibuprofen  Tablet. 400 milliGRAM(s) Oral every 6 hours  ketorolac   Injectable 15 milliGRAM(s) IV Push every 6 hours  lactated ringers. 1000 milliLiter(s) IV Continuous <Continuous>  lactated ringers. 1000 milliLiter(s) IV Continuous <Continuous>  metoclopramide Injectable 10 milliGRAM(s) IV Push every 8 hours PRN  ondansetron Injectable 4 milliGRAM(s) IV Push once PRN  ondansetron Injectable 4 milliGRAM(s) IV Push every 6 hours PRN  oxyCODONE    IR 10 milliGRAM(s) Oral every 4 hours PRN  oxyCODONE    IR 5 milliGRAM(s) Oral every 4 hours PRN  pantoprazole  Injectable 40 milliGRAM(s) IV Push daily  senna 2 Tablet(s) Oral at bedtime      RADIOLOGY, EKG & ADDITIONAL TESTS: Reviewed.

## 2023-12-01 NOTE — PROGRESS NOTE ADULT - SUBJECTIVE AND OBJECTIVE BOX
Surgery Daily Progress Note  =====================================================  SUBJECTIVE: A 47y Female Patient seen and examined at bedside on AM rounds. No acute events overnight. Denies pain, nausea, vomiting, fever, SOB.      ALLERGIES:  No Known Allergies      --------------------------------------------------------------------------------------    MEDICATIONS:    Neurologic Medications  acetaminophen   IVPB .. 1000 milliGRAM(s) IV Intermittent every 6 hours  HYDROmorphone  Injectable 0.5 milliGRAM(s) IV Push every 10 minutes PRN Moderate Pain (4 - 6)  ibuprofen  Tablet. 400 milliGRAM(s) Oral every 6 hours  metoclopramide Injectable 10 milliGRAM(s) IV Push every 8 hours PRN Nausea and/or Vomiting  ondansetron Injectable 4 milliGRAM(s) IV Push once PRN Nausea and/or Vomiting  ondansetron Injectable 4 milliGRAM(s) IV Push every 6 hours PRN Nausea and/or Vomiting  oxyCODONE    IR 10 milliGRAM(s) Oral every 4 hours PRN Severe Pain (7 - 10)  oxyCODONE    IR 5 milliGRAM(s) Oral every 4 hours PRN Moderate Pain (4 - 6)    Respiratory Medications  diphenhydrAMINE Injectable 25 milliGRAM(s) IV Push every 4 hours PRN Itching    Cardiovascular Medications    Gastrointestinal Medications  calcium carbonate    500 mG (Tums) Chewable 1 Tablet(s) Chew every 4 hours PRN Dyspepsia  pantoprazole  Injectable 40 milliGRAM(s) IV Push daily  senna 2 Tablet(s) Oral at bedtime    Genitourinary Medications    Hematologic/Oncologic Medications  enoxaparin Injectable 40 milliGRAM(s) SubCutaneous every 24 hours  influenza   Vaccine 0.5 milliLiter(s) IntraMuscular once    Antimicrobial/Immunologic Medications  ceFAZolin   IVPB 2000 milliGRAM(s) IV Intermittent every 8 hours    Endocrine/Metabolic Medications    Topical/Other Medications    --------------------------------------------------------------------------------------    VITAL SIGNS:  No Known Allergies      T(C): 36.7 (12-01-23 @ 07:00), Max: 36.7 (11-30-23 @ 18:00)  HR: 60 (12-01-23 @ 07:00) (60 - 91)  BP: 99/62 (12-01-23 @ 07:00) (99/62 - 119/63)  RR: 13 (12-01-23 @ 07:00) (11 - 17)  SpO2: 100% (12-01-23 @ 07:00) (96% - 100%)  --------------------------------------------------------------------------------------    EXAM    General: NAD, resting in bed comfortably.  Neuro: AOx3  Psych: Normal affect  Cardiac: regular rate, warm  Respiratory: Nonlabored respirations  Breast: Bilateral nipple sparing mastectomy breasts are soft, appropriately tender, no overlying skin changes. No signs of ischemia. Vioptics are in place with good signals.     --------------------------------------------------------------------------------------    I&Os  T(C): 36.7 (12-01-23 @ 07:00), Max: 36.7 (11-30-23 @ 18:00)  HR: 60 (12-01-23 @ 07:00) (60 - 91)  BP: 99/62 (12-01-23 @ 07:00) (99/62 - 119/63)  RR: 13 (12-01-23 @ 07:00) (11 - 17)  SpO2: 100% (12-01-23 @ 07:00) (96% - 100%)  --------------------------------------------------------------------------------------    LABS                          8.1    8.06  )-----------( 131      ( 01 Dec 2023 05:45 )             26.1     12-01    138  |  106  |  8   ----------------------------<  108<H>  4.2   |  24  |  0.69    Ca    8.4      01 Dec 2023 05:45        Urinalysis Basic - ( 01 Dec 2023 05:45 )    Color: x / Appearance: x / SG: x / pH: x  Gluc: 108 mg/dL / Ketone: x  / Bili: x / Urobili: x   Blood: x / Protein: x / Nitrite: x   Leuk Esterase: x / RBC: x / WBC x   Sq Epi: x / Non Sq Epi: x / Bacteria: x        11-30-23 @ 07:01  -  12-01-23 @ 07:00  --------------------------------------------------------  IN: 1650 mL / OUT: 1715 mL / NET: -65 mL      acetaminophen   IVPB .. 1000 milliGRAM(s) IV Intermittent every 6 hours  calcium carbonate    500 mG (Tums) Chewable 1 Tablet(s) Chew every 4 hours PRN  ceFAZolin   IVPB 2000 milliGRAM(s) IV Intermittent every 8 hours  diphenhydrAMINE Injectable 25 milliGRAM(s) IV Push every 4 hours PRN  enoxaparin Injectable 40 milliGRAM(s) SubCutaneous every 24 hours  HYDROmorphone  Injectable 0.5 milliGRAM(s) IV Push every 10 minutes PRN  ibuprofen  Tablet. 400 milliGRAM(s) Oral every 6 hours  influenza   Vaccine 0.5 milliLiter(s) IntraMuscular once  metoclopramide Injectable 10 milliGRAM(s) IV Push every 8 hours PRN  ondansetron Injectable 4 milliGRAM(s) IV Push once PRN  ondansetron Injectable 4 milliGRAM(s) IV Push every 6 hours PRN  oxyCODONE    IR 10 milliGRAM(s) Oral every 4 hours PRN  oxyCODONE    IR 5 milliGRAM(s) Oral every 4 hours PRN  pantoprazole  Injectable 40 milliGRAM(s) IV Push daily  senna 2 Tablet(s) Oral at bedtime      RADIOLOGY, EKG & ADDITIONAL TESTS: Reviewed.

## 2023-12-01 NOTE — PROGRESS NOTE ADULT - SUBJECTIVE AND OBJECTIVE BOX
Doing well      Exam  Breasts: Incisions clean, dry and intact.  No collections.  No erythema.  Skin viable.    Flaps:  Soft.  Capillary refill 2-3 seconds.  Viable.  +doppler signals  Viopitx:  80-90's    Abdomen:  Incision clean, dry and intact.   No collections.  No erythema.  Skin viable.      Assessment and Plan    ambulate  regular diet  roney angela dc ivf  q2hr flap checks

## 2023-12-01 NOTE — PATIENT PROFILE ADULT - FALL HARM RISK - HARM RISK INTERVENTIONS

## 2023-12-01 NOTE — DISCHARGE NOTE NURSING/CASE MANAGEMENT/SOCIAL WORK - PATIENT PORTAL LINK FT
You can access the FollowMyHealth Patient Portal offered by [facility] by registering at the following website: [email or web address]. By joining [employer]’s FollowMyHealth portal, you will also be able to view your health information using other applications (apps) compatible with our system. You can access the FollowMyHealth Patient Portal offered by [facility] by registering at the following website: [email or web address]. By joining [employer]’s FollowMyHealth portal, you will also be able to view your health information using other applications (apps) compatible with our system. You can access the FollowMyHealth Patient Portal offered by  by registering at the following website: [email or web address]. By joining [employer]’s FollowMyHealth portal, you will also be able to view your health information using other applications (apps) compatible with our system.

## 2023-12-01 NOTE — PROGRESS NOTE ADULT - ASSESSMENT
A 47 year old female POD-1 from bilateral mastectomy with JEROMY flap reconstruction. Patient is healing appropriately. Denies pain, nausea, vomiting. Flaps are viable no signs of ischemia with good signals and appropriate capillary refills.     Plan:  - Pain management PRN  - Monitor drain outputs  - DVT PPx  - Rest of care per primary team    D-Team  [id number] A 47 year old female POD-1 from bilateral mastectomy with JEROMY flap reconstruction. Patient is healing appropriately. Denies pain, nausea, vomiting. Flaps are viable no signs of ischemia with good signals and appropriate capillary refills.     Plan:  - Pain management PRN  - Monitor drain outputs  - DVT PPx  - Rest of care per primary team    D-Team  [id number] A 47 year old female POD-1 from bilateral mastectomy with JEROMY flap reconstruction. Patient is healing appropriately. Denies pain, nausea, vomiting. Flaps are viable no signs of ischemia with good signals and appropriate capillary refills.     Plan:  - Pain management PRN  - Monitor drain outputs  - DVT PPx  - Rest of care per primary team    D-Team  [id number]

## 2023-12-02 RX ORDER — ACETAMINOPHEN 500 MG
975 TABLET ORAL EVERY 8 HOURS
Refills: 0 | Status: DISCONTINUED | OUTPATIENT
Start: 2023-12-02 | End: 2023-12-03

## 2023-12-02 RX ORDER — ACETAMINOPHEN 500 MG
650 TABLET ORAL EVERY 6 HOURS
Refills: 0 | Status: DISCONTINUED | OUTPATIENT
Start: 2023-12-02 | End: 2023-12-02

## 2023-12-02 RX ADMIN — Medication 400 MILLIGRAM(S): at 22:38

## 2023-12-02 RX ADMIN — OXYCODONE HYDROCHLORIDE 5 MILLIGRAM(S): 5 TABLET ORAL at 08:15

## 2023-12-02 RX ADMIN — Medication 975 MILLIGRAM(S): at 13:28

## 2023-12-02 RX ADMIN — SENNA PLUS 2 TABLET(S): 8.6 TABLET ORAL at 22:39

## 2023-12-02 RX ADMIN — Medication 400 MILLIGRAM(S): at 11:45

## 2023-12-02 RX ADMIN — Medication 975 MILLIGRAM(S): at 22:38

## 2023-12-02 RX ADMIN — Medication 400 MILLIGRAM(S): at 05:25

## 2023-12-02 RX ADMIN — Medication 400 MILLIGRAM(S): at 23:24

## 2023-12-02 RX ADMIN — Medication 400 MILLIGRAM(S): at 18:20

## 2023-12-02 RX ADMIN — PANTOPRAZOLE SODIUM 40 MILLIGRAM(S): 20 TABLET, DELAYED RELEASE ORAL at 11:47

## 2023-12-02 RX ADMIN — Medication 975 MILLIGRAM(S): at 23:24

## 2023-12-02 RX ADMIN — OXYCODONE HYDROCHLORIDE 5 MILLIGRAM(S): 5 TABLET ORAL at 09:15

## 2023-12-02 RX ADMIN — ENOXAPARIN SODIUM 40 MILLIGRAM(S): 100 INJECTION SUBCUTANEOUS at 08:16

## 2023-12-02 RX ADMIN — Medication 400 MILLIGRAM(S): at 06:25

## 2023-12-02 RX ADMIN — Medication 100 MILLIGRAM(S): at 13:28

## 2023-12-02 RX ADMIN — Medication 400 MILLIGRAM(S): at 17:24

## 2023-12-02 RX ADMIN — Medication 100 MILLIGRAM(S): at 08:16

## 2023-12-02 RX ADMIN — Medication 100 MILLIGRAM(S): at 22:40

## 2023-12-02 NOTE — PROGRESS NOTE ADULT - SUBJECTIVE AND OBJECTIVE BOX
Plastic Surgery Progress Note (pg [facility]: [id number], [facility]: [phone])    SUBJECTIVE  Pt comfortable in bed. Pain well controlled, no complaints.    OBJECTIVE  ___________________________________________________  VITAL SIGNS / I&O's   Vital Signs Last 24 Hrs  T(C): 37.1 (02 Dec 2023 05:49), Max: 37.4 (02 Dec 2023 01:52)  T(F): 98.7 (02 Dec 2023 05:49), Max: 99.3 (02 Dec 2023 01:52)  HR: 71 (02 Dec 2023 05:49) (71 - 76)  BP: 108/61 (02 Dec 2023 05:49) (101/60 - 108/61)  BP(mean): --  RR: 16 (02 Dec 2023 05:49) (16 - 16)  SpO2: 100% (02 Dec 2023 05:49) (100% - 100%)    Parameters below as of 02 Dec 2023 05:49  Patient On (Oxygen Delivery Method): room air          01 Dec 2023 07:01  -  02 Dec 2023 07:00  --------------------------------------------------------  IN:    IV PiggyBack: 100 mL    Oral Fluid: 2680 mL  Total IN: 2780 mL    OUT:    Bulb (mL): 48.5 mL    Bulb (mL): 46 mL    Bulb (mL): 22 mL    Bulb (mL): 65 mL    Bulb (mL): 28.5 mL    Bulb (mL): 13.5 mL    Voided (mL): 600 mL  Total OUT: 823.5 mL    Total NET: 1956.5 mL      02 Dec 2023 07:01  -  02 Dec 2023 09:47  --------------------------------------------------------  IN:  Total IN: 0 mL    OUT:    Bulb (mL): 7.5 mL    Bulb (mL): 6 mL    Bulb (mL): 7.5 mL    Bulb (mL): 3 mL    Bulb (mL): 1 mL    Bulb (mL): 10 mL    Voided (mL): 700 mL  Total OUT: 735 mL    Total NET: -735 mL        ___________________________________________________  PHYSICAL EXAM    -- CONSTITUTIONAL: NAD, lying in bed  -- NEURO: Awake, alert  Breast:  L  soft, non-tender  Flap viable, well-perfused, appropriate color, with 2-3 second capillary refill  No palpable fluid collections  vioptix signal stable  jps s/s  R   soft, non-tender  Flap viable, well-perfused, appropriate color, with 2-3 second capillary refill  No palpable fluid collections  vioptix signal stable  jps s/s  -- PULM: Non-labored respirations  -- ABDOMEN: Incision c/d/i, no palpable fluid collection  jps s/s     ___________________________________________________  LABS                        8.1    8.06  )-----------( 131      ( 01 Dec 2023 05:45 )             26.1     01 Dec 2023 05:45    138    |  106    |  8      ----------------------------<  108    4.2     |  24     |  0.69     Ca    8.4        01 Dec 2023 05:45        CAPILLARY BLOOD GLUCOSE            Urinalysis Basic - ( 01 Dec 2023 05:45 )    Color: x / Appearance: x / SG: x / pH: x  Gluc: 108 mg/dL / Ketone: x  / Bili: x / Urobili: x   Blood: x / Protein: x / Nitrite: x   Leuk Esterase: x / RBC: x / WBC x   Sq Epi: x / Non Sq Epi: x / Bacteria: x      ___________________________________________________  MICRO  Recent Cultures:    ___________________________________________________  MEDICATIONS  (STANDING):  enoxaparin Injectable 40 milliGRAM(s) SubCutaneous every 24 hours  ibuprofen  Tablet. 400 milliGRAM(s) Oral every 6 hours  influenza   Vaccine 0.5 milliLiter(s) IntraMuscular once  pantoprazole  Injectable 40 milliGRAM(s) IV Push daily  senna 2 Tablet(s) Oral at bedtime    MEDICATIONS  (PRN):  calcium carbonate    500 mG (Tums) Chewable 1 Tablet(s) Chew every 4 hours PRN Dyspepsia  diphenhydrAMINE Injectable 25 milliGRAM(s) IV Push every 4 hours PRN Itching  docusate sodium 100 milliGRAM(s) Oral three times a day PRN Constipation  metoclopramide Injectable 10 milliGRAM(s) IV Push every 8 hours PRN Nausea and/or Vomiting  ondansetron Injectable 4 milliGRAM(s) IV Push every 6 hours PRN Nausea and/or Vomiting  oxyCODONE    IR 5 milliGRAM(s) Oral every 4 hours PRN Moderate Pain (4 - 6)  oxyCODONE    IR 10 milliGRAM(s) Oral every 4 hours PRN Severe Pain (7 - 10)

## 2023-12-02 NOTE — PROGRESS NOTE ADULT - SUBJECTIVE AND OBJECTIVE BOX
Complains of headache  Denies any other neuro symptom.  No history of headaches/migraines.  Reports not drinking coffee today and limited rest overnight.        MEDICATIONS  (STANDING):  acetaminophen     Tablet .. 975 milliGRAM(s) Oral every 8 hours  enoxaparin Injectable 40 milliGRAM(s) SubCutaneous every 24 hours  ibuprofen  Tablet. 400 milliGRAM(s) Oral every 6 hours  influenza   Vaccine 0.5 milliLiter(s) IntraMuscular once  pantoprazole  Injectable 40 milliGRAM(s) IV Push daily  senna 2 Tablet(s) Oral at bedtime    MEDICATIONS  (PRN):  calcium carbonate    500 mG (Tums) Chewable 1 Tablet(s) Chew every 4 hours PRN Dyspepsia  diphenhydrAMINE Injectable 25 milliGRAM(s) IV Push every 4 hours PRN Itching  docusate sodium 100 milliGRAM(s) Oral three times a day PRN Constipation  metoclopramide Injectable 10 milliGRAM(s) IV Push every 8 hours PRN Nausea and/or Vomiting  ondansetron Injectable 4 milliGRAM(s) IV Push every 6 hours PRN Nausea and/or Vomiting  oxyCODONE    IR 10 milliGRAM(s) Oral every 4 hours PRN Severe Pain (7 - 10)  oxyCODONE    IR 5 milliGRAM(s) Oral every 4 hours PRN Moderate Pain (4 - 6)      Vital Signs Last 24 Hrs  T(C): 37.1 (02 Dec 2023 10:10), Max: 37.4 (02 Dec 2023 01:52)  T(F): 98.8 (02 Dec 2023 10:10), Max: 99.3 (02 Dec 2023 01:52)  HR: 73 (02 Dec 2023 10:10) (71 - 76)  BP: 96/60 (02 Dec 2023 10:10) (96/60 - 108/61)  BP(mean): --  RR: 16 (02 Dec 2023 10:10) (16 - 16)  SpO2: 99% (02 Dec 2023 10:10) (99% - 100%)    Parameters below as of 02 Dec 2023 10:10  Patient On (Oxygen Delivery Method): room air        I&O's Detail    01 Dec 2023 07:01  -  02 Dec 2023 07:00  --------------------------------------------------------  IN:    IV PiggyBack: 100 mL    Oral Fluid: 2680 mL  Total IN: 2780 mL    OUT:    Bulb (mL): 48.5 mL    Bulb (mL): 46 mL    Bulb (mL): 22 mL    Bulb (mL): 65 mL    Bulb (mL): 28.5 mL    Bulb (mL): 13.5 mL    Voided (mL): 600 mL  Total OUT: 823.5 mL    Total NET: 1956.5 mL      02 Dec 2023 07:01  -  02 Dec 2023 10:33  --------------------------------------------------------  IN:  Total IN: 0 mL    OUT:    Bulb (mL): 7.5 mL    Bulb (mL): 6 mL    Bulb (mL): 7.5 mL    Bulb (mL): 3 mL    Bulb (mL): 1 mL    Bulb (mL): 10 mL    Voided (mL): 700 mL  Total OUT: 735 mL    Total NET: -735 mL              Exam  Breasts: Incisions clean, dry and intact.  No collections.  No erythema.  Skin viable.    Flaps:  Soft.  Capillary refill 2-3 seconds.  Viable  Viopitx:  stable    Abdomen:  Incision clean, dry and intact.   No collections.  No erythema.  Skin viable.      Assessment and Plan    DC Vioptix  Ambulate  Will reassess later today for possible DC depending on headache.  Recommended coffee to avoid caffeine headache.

## 2023-12-02 NOTE — PROGRESS NOTE ADULT - SUBJECTIVE AND OBJECTIVE BOX
SUBJECTIVE:  Doing well. Pain controlled, afebrile with stable vital signs.   No overnight events.     OBJECTIVE:     ** VITAL SIGNS / I&O's **    T(C): 37.4 (12-02-23 @ 01:52), Max: 37.4 (12-02-23 @ 01:52)  T(F): 99.3 (12-02-23 @ 01:52), Max: 99.3 (12-02-23 @ 01:52)  HR: 76 (12-02-23 @ 01:52) (60 - 79)  BP: 103/60 (12-02-23 @ 01:52) (99/58 - 103/60)  RR: 16 (12-02-23 @ 01:52) (13 - 17)  SpO2: 100% (12-02-23 @ 01:52) (100% - 100%)      30 Nov 2023 07:01  -  01 Dec 2023 07:00  --------------------------------------------------------  IN:    IV PiggyBack: 150 mL    Lactated Ringers: 1500 mL  Total IN: 1650 mL    OUT:    Bulb (mL): 32 mL    Bulb (mL): 33 mL    Bulb (mL): 29 mL    Bulb (mL): 9 mL    Bulb (mL): 39 mL    Bulb (mL): 63 mL    Indwelling Catheter - Urethral (mL): 1510 mL  Total OUT: 1715 mL    Total NET: -65 mL      01 Dec 2023 07:01  -  02 Dec 2023 05:16  --------------------------------------------------------  IN:    IV PiggyBack: 100 mL    Oral Fluid: 2440 mL  Total IN: 2540 mL    OUT:    Bulb (mL): 23.5 mL    Bulb (mL): 19.5 mL    Bulb (mL): 57.5 mL    Bulb (mL): 43.5 mL    Bulb (mL): 38.5 mL    Bulb (mL): 11 mL    Voided (mL): 400 mL  Total OUT: 593.5 mL    Total NET: 1946.5 mL      General: NAD, resting in bed comfortably.  Neuro: AOx3  Psych: Normal affect  Cardiac: regular rate, warm  Respiratory: Nonlabored respirations  Breast: Bilateral nipple sparing mastectomy breasts are soft, appropriately tender, no overlying skin changes. No signs of ischemia. Vioptics are in place with good signals.       ** LABS**                 CAPILLARY BLOOD GLUCOSE

## 2023-12-02 NOTE — PROGRESS NOTE ADULT - ASSESSMENT
Margot is a 46yo F s/p BL mastectomy with JEROMY free flap reconstruction on 11/30. Patient is recovering well.     Plan  - Advance to q4 flap checks.  - Will monitor drains   - dc O2  - Keep HOB >30 degrees   - Advance diet  - Will dc vioptix  - Remove angela  - Ambulate with assistance  - IS  - Pain management tylenol motrin oxy    Plastic Surgery  #[id number] [facility] pager  [phone] - [phone] [facility] pager

## 2023-12-02 NOTE — PROGRESS NOTE ADULT - ASSESSMENT
A 47 year old female POD-1 from bilateral mastectomy with JEROMY flap reconstruction. Patient is healing appropriately. Denies pain, nausea, vomiting. Flaps are viable no signs of ischemia with good signals and appropriate capillary refills.     Plan:  - Pain management PRN  - Monitor drain outputs  - DVT PPx  - Rest of care per primary team    D-Team  [id number] A 47 year old female now s/p  bilateral mastectomy with JEROMY flap reconstruction 11/30. Patient is healing appropriately. Denies pain, nausea, vomiting. Flaps are viable no signs of ischemia with good signals and appropriate capillary refills.     Plan:  - Pain management PRN  - Monitor drain outputs  - DVT PPx  - Rest of care per primary team    D-Team  [id number]

## 2023-12-03 VITALS
HEART RATE: 77 BPM | TEMPERATURE: 99 F | OXYGEN SATURATION: 99 % | SYSTOLIC BLOOD PRESSURE: 116 MMHG | RESPIRATION RATE: 16 BRPM | DIASTOLIC BLOOD PRESSURE: 77 MMHG

## 2023-12-03 RX ORDER — IBUPROFEN 200 MG
1 TABLET ORAL
Qty: 0 | Refills: 0 | DISCHARGE
Start: 2023-12-03

## 2023-12-03 RX ORDER — OXYCODONE HYDROCHLORIDE 5 MG/1
1 TABLET ORAL
Qty: 4 | Refills: 0
Start: 2023-12-03

## 2023-12-03 RX ORDER — ACETAMINOPHEN 500 MG
3 TABLET ORAL
Qty: 0 | Refills: 0 | DISCHARGE
Start: 2023-12-03

## 2023-12-03 RX ADMIN — Medication 400 MILLIGRAM(S): at 06:07

## 2023-12-03 RX ADMIN — Medication 400 MILLIGRAM(S): at 07:00

## 2023-12-03 RX ADMIN — ENOXAPARIN SODIUM 40 MILLIGRAM(S): 100 INJECTION SUBCUTANEOUS at 09:29

## 2023-12-03 RX ADMIN — Medication 975 MILLIGRAM(S): at 07:00

## 2023-12-03 RX ADMIN — Medication 400 MILLIGRAM(S): at 12:09

## 2023-12-03 RX ADMIN — Medication 100 MILLIGRAM(S): at 06:09

## 2023-12-03 RX ADMIN — Medication 975 MILLIGRAM(S): at 06:07

## 2023-12-03 NOTE — PROGRESS NOTE ADULT - ASSESSMENT
Margot is a 46yo F s/p BL mastectomy with JEROMY free flap reconstruction on 11/30. Patient is recovering well.     Plan  - Advance to q4 flap checks.  - Will monitor drains   - dc O2  - Keep HOB >30 degrees   - Ambulate with assistance  - IS  - Pain management tylenol motrin oxy  - Stable for dc    Plastic Surgery  #[id number] [facility] pager  [phone] - [phone] [facility] pager Margot is a 46yo F s/p BL mastectomy with JEROMY free flap reconstruction on 11/30. Patient is recovering well.     Plan  - Advance to q4 flap checks.  - Will monitor drains   - dc O2  - Keep HOB >30 degrees   - Ambulate with assistance  - IS  - Pain management tylenol motrin oxy  - Stable for dc    Plastic Surgery  #[id number] [facility] pager  [phone] - [phone] [facility] pager Margot is a 48yo F s/p BL mastectomy with JEROMY free flap reconstruction on 11/30. Patient is recovering well.     Plan  - Advance to q4 flap checks.  - Will monitor drains   - dc O2  - Keep HOB >30 degrees   - Ambulate with assistance  - IS  - Pain management tylenol motrin oxy  - Stable for dc    Plastic Surgery  #[id number] [facility] pager  [phone] [facility] pager

## 2023-12-03 NOTE — PROGRESS NOTE ADULT - SUBJECTIVE AND OBJECTIVE BOX
Doing well  ready for discharge      MEDICATIONS  (STANDING):  acetaminophen     Tablet .. 975 milliGRAM(s) Oral every 8 hours  enoxaparin Injectable 40 milliGRAM(s) SubCutaneous every 24 hours  ibuprofen  Tablet. 400 milliGRAM(s) Oral every 6 hours  influenza   Vaccine 0.5 milliLiter(s) IntraMuscular once  pantoprazole  Injectable 40 milliGRAM(s) IV Push daily  senna 2 Tablet(s) Oral at bedtime    MEDICATIONS  (PRN):  calcium carbonate    500 mG (Tums) Chewable 1 Tablet(s) Chew every 4 hours PRN Dyspepsia  diphenhydrAMINE Injectable 25 milliGRAM(s) IV Push every 4 hours PRN Itching  docusate sodium 100 milliGRAM(s) Oral three times a day PRN Constipation  metoclopramide Injectable 10 milliGRAM(s) IV Push every 8 hours PRN Nausea and/or Vomiting  ondansetron Injectable 4 milliGRAM(s) IV Push every 6 hours PRN Nausea and/or Vomiting  oxyCODONE    IR 10 milliGRAM(s) Oral every 4 hours PRN Severe Pain (7 - 10)  oxyCODONE    IR 5 milliGRAM(s) Oral every 4 hours PRN Moderate Pain (4 - 6)      Vital Signs Last 24 Hrs  T(C): 37.1 (03 Dec 2023 09:58), Max: 37.2 (02 Dec 2023 17:23)  T(F): 98.8 (03 Dec 2023 09:58), Max: 99 (02 Dec 2023 17:23)  HR: 77 (03 Dec 2023 09:58) (74 - 82)  BP: 116/77 (03 Dec 2023 09:58) (100/57 - 120/74)  BP(mean): --  RR: 16 (03 Dec 2023 09:58) (15 - 18)  SpO2: 99% (03 Dec 2023 09:58) (97% - 100%)    Parameters below as of 03 Dec 2023 09:58  Patient On (Oxygen Delivery Method): room air        I&O's Detail    02 Dec 2023 07:01  -  03 Dec 2023 07:00  --------------------------------------------------------  IN:    Oral Fluid: 1200 mL  Total IN: 1200 mL    OUT:    Bulb (mL): 60 mL    Bulb (mL): 23 mL    Bulb (mL): 29 mL    Bulb (mL): 32 mL    Bulb (mL): 34 mL    Bulb (mL): 60 mL    IV PiggyBack: 0 mL    Voided (mL): 3800 mL  Total OUT: 4038 mL    Total NET: -2838 mL              Exam  Breasts: Incisions clean, dry and intact.  No collections.  No erythema.  Skin viable.    Flaps:  Soft.  Capillary refill 2-3 seconds.  Viable      Abdomen:  Incision clean, dry and intact.   No collections.  No erythema.  Skin viable.      Assessment and Plan  DC home  Follow up this week

## 2023-12-03 NOTE — PROGRESS NOTE ADULT - PROVIDER SPECIALTY LIST ADULT
Plastic Surgery
Plastic Surgery
Surgery
Surgery
Plastic Surgery
Surgery
Plastic Surgery

## 2023-12-03 NOTE — PROGRESS NOTE ADULT - SUBJECTIVE AND OBJECTIVE BOX
INTERVAL HPI/OVERNIGHT EVENTS:  Seen and examined at bedside. No acute events overnight.    PRESSORS: [  ] YES [ X ] NO  WHICH:    Antimicrobial:  cefepime   IVPB 1000 milliGRAM(s) IV Intermittent every 12 hours    Cardiovascular:  metoprolol tartrate Injectable 5 milliGRAM(s) IV Push every 6 hours    Pulmonary:  albuterol/ipratropium for Nebulization 3 milliLiter(s) Nebulizer every 6 hours    Hematalogic:  heparin   Injectable 5000 Unit(s) SubCutaneous every 8 hours    Other:  chlorhexidine 2% Cloths 1 Application(s) Topical <User Schedule>  dextrose 5%. 1000 milliLiter(s) IV Continuous <Continuous>  levothyroxine Injectable 38 MICROGram(s) IV Push at bedtime  pantoprazole  Injectable 40 milliGRAM(s) IV Push daily    albuterol/ipratropium for Nebulization 3 milliLiter(s) Nebulizer every 6 hours  cefepime   IVPB 1000 milliGRAM(s) IV Intermittent every 12 hours  chlorhexidine 2% Cloths 1 Application(s) Topical <User Schedule>  dextrose 5%. 1000 milliLiter(s) IV Continuous <Continuous>  heparin   Injectable 5000 Unit(s) SubCutaneous every 8 hours  levothyroxine Injectable 38 MICROGram(s) IV Push at bedtime  metoprolol tartrate Injectable 5 milliGRAM(s) IV Push every 6 hours  pantoprazole  Injectable 40 milliGRAM(s) IV Push daily    Drug Dosing Weight    Weight (kg): 63.458 (01 Jun 2023 17:52)    CENTRAL LINE: [ ] YES [X ] NO  LOCATION:   DATE INSERTED:  REMOVE: [ ] YES [ ] NO  EXPLAIN:    ALVAREZ: [X ] YES [ ] NO    DATE INSERTED:  REMOVE:  [ ] YES [ ] NO  EXPLAIN:    A-LINE:  [ ] YES [X ] NO  LOCATION:   DATE INSERTED:  REMOVE:  [ ] YES [ ] NO  EXPLAIN:    PMH -reviewed admission note, no change since admission  PAST MEDICAL & SURGICAL HISTORY:  Asthma with acute exacerbation, unspecified asthma severity      Essential hypertension      Hypothyroid      GERD (gastroesophageal reflux disease)      Constipation      Fe deficiency anemia      History of arthroplasty of right hip          ICU Vital Signs Last 24 Hrs  T(C): 37 (04 Jun 2023 00:00), Max: 37.1 (03 Jun 2023 20:00)  T(F): 98.6 (04 Jun 2023 00:00), Max: 98.7 (03 Jun 2023 20:00)  HR: 95 (04 Jun 2023 00:00) (95 - 138)  BP: 138/51 (04 Jun 2023 00:00) (125/62 - 163/61)  BP(mean): 74 (04 Jun 2023 00:00) (68 - 89)  ABP: --  ABP(mean): --  RR: 18 (04 Jun 2023 00:00) (15 - 21)  SpO2: 97% (04 Jun 2023 00:00) (92% - 100%)    O2 Parameters below as of 04 Jun 2023 00:00  Patient On (Oxygen Delivery Method): nasal cannula  O2 Flow (L/min): 2          ABG - ( 02 Jun 2023 03:49 )  pH, Arterial: 7.25  pH, Blood: x     /  pCO2: 49    /  pO2: 97    / HCO3: 22    / Base Excess: -6.0  /  SaO2: 98                    06-02 @ 07:01  -  06-03 @ 07:00  --------------------------------------------------------  IN: 3200 mL / OUT: 3155 mL / NET: 45 mL            PHYSICAL EXAM:  GENERAL: lethargic, elderly, on 2L NC sat 96%  HEAD:  Atraumatic, Normocephalic  EYES:  conjunctiva and sclera clear  NECK: Supple, No JVD  CHEST/LUNG: CTAB No rales, rhonchi, wheezing, or rubs  HEART: tachy rate and reg rhythm; No murmurs, rubs, or gallops  ABDOMEN: Soft, Nontender, Nondistended; Bowel sounds present  NERVOUS SYSTEM:  Alert & Oriented X0, not responding to commands. pupils PERRL, CN 2-12 grossly intact. Moving all extremities willfully and equally. Strength 5/5 in all four extremities.  EXTREMITIES:  2+ Peripheral Pulses, No clubbing, cyanosis, or edema  SKIN: warm dry - no decub ulcer over back. + RIGHT hip incision site CDI dressing.      LABS:  CBC Full  -  ( 03 Jun 2023 05:56 )  WBC Count : 11.19 K/uL  RBC Count : 3.11 M/uL  Hemoglobin : 10.1 g/dL  Hematocrit : 30.4 %  Platelet Count - Automated : 241 K/uL  Mean Cell Volume : 97.7 fl  Mean Cell Hemoglobin : 32.5 pg  Mean Cell Hemoglobin Concentration : 33.2 gm/dL  Auto Neutrophil # : 9.44 K/uL  Auto Lymphocyte # : 0.52 K/uL  Auto Monocyte # : 1.04 K/uL  Auto Eosinophil # : 0.11 K/uL  Auto Basophil # : 0.02 K/uL  Auto Neutrophil % : 82.7 %  Auto Lymphocyte % : 4.6 %  Auto Monocyte % : 9.1 %  Auto Eosinophil % : 1.0 %  Auto Basophil % : 0.2 %    06-03    148<H>  |  119<H>  |  24<H>  ----------------------------<  118<H>  4.4   |  28  |  0.31<L>    Ca    7.8<L>      03 Jun 2023 13:20  Phos  3.1     06-03  Mg     1.8     06-03    TPro  5.2<L>  /  Alb  1.9<L>  /  TBili  0.5  /  DBili  x   /  AST  67<H>  /  ALT  21  /  AlkPhos  128<H>  06-03        Culture Results:   No growth (06-01 @ 20:55)  Culture Results:   No growth to date. (06-01 @ 18:50)  Culture Results:   No growth to date. (06-01 @ 18:45)      RADIOLOGY & ADDITIONAL STUDIES REVIEWED:  ***    [ ]GOALS OF CARE DISCUSSION WITH PATIENT/FAMILY/PROXY:    CRITICAL CARE TIME SPENT: 35 minutes Plastic Surgery Progress Note (pg [facility]: [id number], NS: [phone])    SUBJECTIVE  The patient was seen and examined.     OBJECTIVE  ___________________________________________________  VITAL SIGNS / I&O's   Vital Signs Last 24 Hrs  T(C): 36.2 (03 Dec 2023 06:00), Max: 37.2 (02 Dec 2023 17:23)  T(F): 97.2 (03 Dec 2023 06:00), Max: 99 (02 Dec 2023 17:23)  HR: 77 (03 Dec 2023 06:00) (73 - 82)  BP: 120/74 (03 Dec 2023 06:00) (96/60 - 120/74)  BP(mean): --  RR: 16 (03 Dec 2023 06:00) (15 - 18)  SpO2: 97% (03 Dec 2023 06:00) (97% - 100%)    Parameters below as of 03 Dec 2023 06:00  Patient On (Oxygen Delivery Method): room air          02 Dec 2023 07:01  -  03 Dec 2023 07:00  --------------------------------------------------------  IN:    Oral Fluid: 1200 mL  Total IN: 1200 mL    OUT:    Bulb (mL): 60 mL    Bulb (mL): 23 mL    Bulb (mL): 29 mL    Bulb (mL): 32 mL    Bulb (mL): 34 mL    Bulb (mL): 60 mL    IV PiggyBack: 0 mL    Voided (mL): 3800 mL  Total OUT: 4038 mL    Total NET: -2838 mL        ___________________________________________________  PHYSICAL EXAM    -- CONSTITUTIONAL: NAD, lying in bed  -- NEURO: Awake, alert  Breast:  L  soft, non-tender  Flap viable, well-perfused, appropriate color, with 2-3 second capillary refill  No palpable fluid collections  jps s/s  R   soft, non-tender  Flap viable, well-perfused, appropriate color, with 2-3 second capillary refill  No palpable fluid collections  jps s/s  -- PULM: Non-labored respirations  -- ABDOMEN: Incision c/d/i, no palpable fluid collection  jps s/s     ___________________________________________________  LABS            CAPILLARY BLOOD GLUCOSE              ___________________________________________________  MICRO  Recent Cultures:    ___________________________________________________  MEDICATIONS  (STANDING):  acetaminophen     Tablet .. 975 milliGRAM(s) Oral every 8 hours  enoxaparin Injectable 40 milliGRAM(s) SubCutaneous every 24 hours  ibuprofen  Tablet. 400 milliGRAM(s) Oral every 6 hours  influenza   Vaccine 0.5 milliLiter(s) IntraMuscular once  pantoprazole  Injectable 40 milliGRAM(s) IV Push daily  senna 2 Tablet(s) Oral at bedtime    MEDICATIONS  (PRN):  calcium carbonate    500 mG (Tums) Chewable 1 Tablet(s) Chew every 4 hours PRN Dyspepsia  diphenhydrAMINE Injectable 25 milliGRAM(s) IV Push every 4 hours PRN Itching  docusate sodium 100 milliGRAM(s) Oral three times a day PRN Constipation  metoclopramide Injectable 10 milliGRAM(s) IV Push every 8 hours PRN Nausea and/or Vomiting  ondansetron Injectable 4 milliGRAM(s) IV Push every 6 hours PRN Nausea and/or Vomiting  oxyCODONE    IR 5 milliGRAM(s) Oral every 4 hours PRN Moderate Pain (4 - 6)  oxyCODONE    IR 10 milliGRAM(s) Oral every 4 hours PRN Severe Pain (7 - 10)   Plastic Surgery Progress Note (pg [facility]: [id number], [facility]: [phone])    SUBJECTIVE  The patient was seen and examined.     OBJECTIVE  ___________________________________________________  VITAL SIGNS / I&O's   Vital Signs Last 24 Hrs  T(C): 36.2 (03 Dec 2023 06:00), Max: 37.2 (02 Dec 2023 17:23)  T(F): 97.2 (03 Dec 2023 06:00), Max: 99 (02 Dec 2023 17:23)  HR: 77 (03 Dec 2023 06:00) (73 - 82)  BP: 120/74 (03 Dec 2023 06:00) (96/60 - 120/74)  BP(mean): --  RR: 16 (03 Dec 2023 06:00) (15 - 18)  SpO2: 97% (03 Dec 2023 06:00) (97% - 100%)    Parameters below as of 03 Dec 2023 06:00  Patient On (Oxygen Delivery Method): room air          02 Dec 2023 07:01  -  03 Dec 2023 07:00  --------------------------------------------------------  IN:    Oral Fluid: 1200 mL  Total IN: 1200 mL    OUT:    Bulb (mL): 60 mL    Bulb (mL): 23 mL    Bulb (mL): 29 mL    Bulb (mL): 32 mL    Bulb (mL): 34 mL    Bulb (mL): 60 mL    IV PiggyBack: 0 mL    Voided (mL): 3800 mL  Total OUT: 4038 mL    Total NET: -2838 mL        ___________________________________________________  PHYSICAL EXAM    -- CONSTITUTIONAL: NAD, lying in bed  -- NEURO: Awake, alert  Breast:  L  soft, non-tender  Flap viable, well-perfused, appropriate color, with 2-3 second capillary refill  No palpable fluid collections  jps s/s  R   soft, non-tender  Flap viable, well-perfused, appropriate color, with 2-3 second capillary refill  No palpable fluid collections  jps s/s  -- PULM: Non-labored respirations  -- ABDOMEN: Incision c/d/i, no palpable fluid collection  jps s/s     ___________________________________________________  LABS            CAPILLARY BLOOD GLUCOSE              ___________________________________________________  MICRO  Recent Cultures:    ___________________________________________________  MEDICATIONS  (STANDING):  acetaminophen     Tablet .. 975 milliGRAM(s) Oral every 8 hours  enoxaparin Injectable 40 milliGRAM(s) SubCutaneous every 24 hours  ibuprofen  Tablet. 400 milliGRAM(s) Oral every 6 hours  influenza   Vaccine 0.5 milliLiter(s) IntraMuscular once  pantoprazole  Injectable 40 milliGRAM(s) IV Push daily  senna 2 Tablet(s) Oral at bedtime    MEDICATIONS  (PRN):  calcium carbonate    500 mG (Tums) Chewable 1 Tablet(s) Chew every 4 hours PRN Dyspepsia  diphenhydrAMINE Injectable 25 milliGRAM(s) IV Push every 4 hours PRN Itching  docusate sodium 100 milliGRAM(s) Oral three times a day PRN Constipation  metoclopramide Injectable 10 milliGRAM(s) IV Push every 8 hours PRN Nausea and/or Vomiting  ondansetron Injectable 4 milliGRAM(s) IV Push every 6 hours PRN Nausea and/or Vomiting  oxyCODONE    IR 5 milliGRAM(s) Oral every 4 hours PRN Moderate Pain (4 - 6)  oxyCODONE    IR 10 milliGRAM(s) Oral every 4 hours PRN Severe Pain (7 - 10)   INTERVAL HPI/OVERNIGHT EVENTS:  Seen and examined at bedside. No acute events overnight. Pt is more awake and alert today. Answering questions appropriately. Complains of abdominal pain and left hip pain.     PRESSORS: [  ] YES [ X ] NO  WHICH:    Antimicrobial:  cefepime   IVPB 1000 milliGRAM(s) IV Intermittent every 12 hours    Cardiovascular:  metoprolol tartrate Injectable 5 milliGRAM(s) IV Push every 6 hours    Pulmonary:  albuterol/ipratropium for Nebulization 3 milliLiter(s) Nebulizer every 6 hours    Hematalogic:  heparin   Injectable 5000 Unit(s) SubCutaneous every 8 hours    Other:  chlorhexidine 2% Cloths 1 Application(s) Topical <User Schedule>  dextrose 5%. 1000 milliLiter(s) IV Continuous <Continuous>  levothyroxine Injectable 38 MICROGram(s) IV Push at bedtime  pantoprazole  Injectable 40 milliGRAM(s) IV Push daily    albuterol/ipratropium for Nebulization 3 milliLiter(s) Nebulizer every 6 hours  cefepime   IVPB 1000 milliGRAM(s) IV Intermittent every 12 hours  chlorhexidine 2% Cloths 1 Application(s) Topical <User Schedule>  dextrose 5%. 1000 milliLiter(s) IV Continuous <Continuous>  heparin   Injectable 5000 Unit(s) SubCutaneous every 8 hours  levothyroxine Injectable 38 MICROGram(s) IV Push at bedtime  metoprolol tartrate Injectable 5 milliGRAM(s) IV Push every 6 hours  pantoprazole  Injectable 40 milliGRAM(s) IV Push daily    Drug Dosing Weight    Weight (kg): 63.458 (01 Jun 2023 17:52)    CENTRAL LINE: [ ] YES [X ] NO  LOCATION:   DATE INSERTED:  REMOVE: [ ] YES [ ] NO  EXPLAIN:    ALVAREZ: [X ] YES [ ] NO    DATE INSERTED:  REMOVE:  [ ] YES [ ] NO  EXPLAIN:    A-LINE:  [ ] YES [X ] NO  LOCATION:   DATE INSERTED:  REMOVE:  [ ] YES [ ] NO  EXPLAIN:    PMH -reviewed admission note, no change since admission  PAST MEDICAL & SURGICAL HISTORY:  Asthma with acute exacerbation, unspecified asthma severity      Essential hypertension      Hypothyroid      GERD (gastroesophageal reflux disease)      Constipation      Fe deficiency anemia      History of arthroplasty of right hip          ICU Vital Signs Last 24 Hrs  T(C): 37 (04 Jun 2023 00:00), Max: 37.1 (03 Jun 2023 20:00)  T(F): 98.6 (04 Jun 2023 00:00), Max: 98.7 (03 Jun 2023 20:00)  HR: 95 (04 Jun 2023 00:00) (95 - 138)  BP: 138/51 (04 Jun 2023 00:00) (125/62 - 163/61)  BP(mean): 74 (04 Jun 2023 00:00) (68 - 89)  ABP: --  ABP(mean): --  RR: 18 (04 Jun 2023 00:00) (15 - 21)  SpO2: 97% (04 Jun 2023 00:00) (92% - 100%)    O2 Parameters below as of 04 Jun 2023 00:00  Patient On (Oxygen Delivery Method): nasal cannula  O2 Flow (L/min): 2          ABG - ( 02 Jun 2023 03:49 )  pH, Arterial: 7.25  pH, Blood: x     /  pCO2: 49    /  pO2: 97    / HCO3: 22    / Base Excess: -6.0  /  SaO2: 98                    06-02 @ 07:01  -  06-03 @ 07:00  --------------------------------------------------------  IN: 3200 mL / OUT: 3155 mL / NET: 45 mL            PHYSICAL EXAM:  GENERAL: lethargic, elderly, on 2L NC sat 96%  HEAD:  Atraumatic, Normocephalic  EYES:  conjunctiva and sclera clear  NECK: Supple, No JVD  CHEST/LUNG: CTAB No rales, rhonchi, wheezing, or rubs  HEART: tachy rate and reg rhythm; No murmurs, rubs, or gallops  ABDOMEN: Soft, + TTP in lower quadrants, Nondistended; Bowel sounds present  NERVOUS SYSTEM:  Alert & Oriented X0, not responding to commands. pupils PERRL, CN 2-12 grossly intact. Moving all extremities willfully and equally. Strength 5/5 in all four extremities.  EXTREMITIES:  2+ Peripheral Pulses, No clubbing, cyanosis, or edema  SKIN: warm dry - no decub ulcer over back. + RIGHT hip incision site CDI dressing.      LABS:  CBC Full  -  ( 03 Jun 2023 05:56 )  WBC Count : 11.19 K/uL  RBC Count : 3.11 M/uL  Hemoglobin : 10.1 g/dL  Hematocrit : 30.4 %  Platelet Count - Automated : 241 K/uL  Mean Cell Volume : 97.7 fl  Mean Cell Hemoglobin : 32.5 pg  Mean Cell Hemoglobin Concentration : 33.2 gm/dL  Auto Neutrophil # : 9.44 K/uL  Auto Lymphocyte # : 0.52 K/uL  Auto Monocyte # : 1.04 K/uL  Auto Eosinophil # : 0.11 K/uL  Auto Basophil # : 0.02 K/uL  Auto Neutrophil % : 82.7 %  Auto Lymphocyte % : 4.6 %  Auto Monocyte % : 9.1 %  Auto Eosinophil % : 1.0 %  Auto Basophil % : 0.2 %    06-03    148<H>  |  119<H>  |  24<H>  ----------------------------<  118<H>  4.4   |  28  |  0.31<L>    Ca    7.8<L>      03 Jun 2023 13:20  Phos  3.1     06-03  Mg     1.8     06-03    TPro  5.2<L>  /  Alb  1.9<L>  /  TBili  0.5  /  DBili  x   /  AST  67<H>  /  ALT  21  /  AlkPhos  128<H>  06-03        Culture Results:   No growth (06-01 @ 20:55)  Culture Results:   No growth to date. (06-01 @ 18:50)  Culture Results:   No growth to date. (06-01 @ 18:45)      RADIOLOGY & ADDITIONAL STUDIES REVIEWED:  ***    [ ]GOALS OF CARE DISCUSSION WITH PATIENT/FAMILY/PROXY:    CRITICAL CARE TIME SPENT: 35 minutes Plastic Surgery Progress Note (pg [facility]: [id number], NS: [phone])    SUBJECTIVE  The patient was seen and examined.     OBJECTIVE  ___________________________________________________  VITAL SIGNS / I&O's   Vital Signs Last 24 Hrs  T(C): 36.2 (03 Dec 2023 06:00), Max: 37.2 (02 Dec 2023 17:23)  T(F): 97.2 (03 Dec 2023 06:00), Max: 99 (02 Dec 2023 17:23)  HR: 77 (03 Dec 2023 06:00) (73 - 82)  BP: 120/74 (03 Dec 2023 06:00) (96/60 - 120/74)  BP(mean): --  RR: 16 (03 Dec 2023 06:00) (15 - 18)  SpO2: 97% (03 Dec 2023 06:00) (97% - 100%)    Parameters below as of 03 Dec 2023 06:00  Patient On (Oxygen Delivery Method): room air          02 Dec 2023 07:01  -  03 Dec 2023 07:00  --------------------------------------------------------  IN:    Oral Fluid: 1200 mL  Total IN: 1200 mL    OUT:    Bulb (mL): 60 mL    Bulb (mL): 23 mL    Bulb (mL): 29 mL    Bulb (mL): 32 mL    Bulb (mL): 34 mL    Bulb (mL): 60 mL    IV PiggyBack: 0 mL    Voided (mL): 3800 mL  Total OUT: 4038 mL    Total NET: -2838 mL        ___________________________________________________  PHYSICAL EXAM    -- CONSTITUTIONAL: NAD, lying in bed  -- NEURO: Awake, alert  Breast:  L  soft, non-tender  Flap viable, well-perfused, appropriate color, with 2-3 second capillary refill  No palpable fluid collections  jps s/s  R   soft, non-tender  Flap viable, well-perfused, appropriate color, with 2-3 second capillary refill  No palpable fluid collections  jps s/s  -- PULM: Non-labored respirations  -- ABDOMEN: Incision c/d/i, no palpable fluid collection  jps s/s     ___________________________________________________  LABS            CAPILLARY BLOOD GLUCOSE              ___________________________________________________  MICRO  Recent Cultures:    ___________________________________________________  MEDICATIONS  (STANDING):  acetaminophen     Tablet .. 975 milliGRAM(s) Oral every 8 hours  enoxaparin Injectable 40 milliGRAM(s) SubCutaneous every 24 hours  ibuprofen  Tablet. 400 milliGRAM(s) Oral every 6 hours  influenza   Vaccine 0.5 milliLiter(s) IntraMuscular once  pantoprazole  Injectable 40 milliGRAM(s) IV Push daily  senna 2 Tablet(s) Oral at bedtime    MEDICATIONS  (PRN):  calcium carbonate    500 mG (Tums) Chewable 1 Tablet(s) Chew every 4 hours PRN Dyspepsia  diphenhydrAMINE Injectable 25 milliGRAM(s) IV Push every 4 hours PRN Itching  docusate sodium 100 milliGRAM(s) Oral three times a day PRN Constipation  metoclopramide Injectable 10 milliGRAM(s) IV Push every 8 hours PRN Nausea and/or Vomiting  ondansetron Injectable 4 milliGRAM(s) IV Push every 6 hours PRN Nausea and/or Vomiting  oxyCODONE    IR 5 milliGRAM(s) Oral every 4 hours PRN Moderate Pain (4 - 6)  oxyCODONE    IR 10 milliGRAM(s) Oral every 4 hours PRN Severe Pain (7 - 10)

## 2023-12-04 NOTE — ASU PREOPERATIVE ASSESSMENT, ADULT ([facility] ONLY) - NOTHING BY MOUTH SINCE
Consult for L perinephric abscess  Will be discharged with drain in place  Needs OV in 2 wk with repeat CT abd with [facility], last seen [facility] 10/3 27-Apr-2023 21:30

## 2023-12-06 ENCOUNTER — TRANSCRIPTION ENCOUNTER (OUTPATIENT)
Age: 48
End: 2023-12-06

## 2023-12-08 LAB
SURGICAL PATHOLOGY STUDY: SIGNIFICANT CHANGE UP
SURGICAL PATHOLOGY STUDY: SIGNIFICANT CHANGE UP

## 2024-01-08 ENCOUNTER — APPOINTMENT (OUTPATIENT)
Dept: OBGYN | Facility: CLINIC | Age: 49
End: 2024-01-08

## 2024-05-22 ENCOUNTER — APPOINTMENT (OUTPATIENT)
Dept: DERMATOLOGY | Facility: CLINIC | Age: 49
End: 2024-05-22
Payer: COMMERCIAL

## 2024-05-22 DIAGNOSIS — L81.4 OTHER MELANIN HYPERPIGMENTATION: ICD-10-CM

## 2024-05-22 DIAGNOSIS — D22.9 MELANOCYTIC NEVI, UNSPECIFIED: ICD-10-CM

## 2024-05-22 DIAGNOSIS — L82.1 OTHER SEBORRHEIC KERATOSIS: ICD-10-CM

## 2024-05-22 DIAGNOSIS — D48.5 NEOPLASM OF UNCERTAIN BEHAVIOR OF SKIN: ICD-10-CM

## 2024-05-22 PROCEDURE — 11102 TANGNTL BX SKIN SINGLE LES: CPT

## 2024-05-22 PROCEDURE — 99203 OFFICE O/P NEW LOW 30 MIN: CPT | Mod: 25

## 2024-05-22 NOTE — PHYSICAL EXAM
[FreeTextEntry3] :  AAOx3, NAD, well-appearing / pleasant Total body skin exam performed within normal limits with the exception of:  - Patient deferred examination of genitalia  Left buttock with asymmetric brown macule Several tan uniform macules and patches over trunk and extremities Fairly uniform and regular brown macules and papules on the trunk and extremities

## 2024-05-22 NOTE — ASSESSMENT
[FreeTextEntry1] : # Neoplasm of uncertain behavior Location: Left buttock DDx: R/o dysplastic nevus/MM Biopsy by shave The risks/benefits/alternatives of skin biopsy were explained to the patient, which include and are not limited to bleeding, infection, scarring or discoloration of skin, and recurrence of lesion. Patient expressed understanding of these risks and provided consent to the procedure. Time out with verification of patient and lesion site was performed. Site was prepped with rubbing alcohol, lidocaine with epinephrine was injected for anesthesia, and biopsy was performed. Specimen sent to path.  Procedure was without complication and well tolerated. Wound care was discussed.  #SKs - discussed benign nature; no therapy indicated at this time - discussed cosmetic removal predominantly on back with cryo, $200 up to 15. Pt will consider  #Lentigines  - discussed benign nature; no therapy indicated at this time  # Multiple melanocytic nevi, all benign appearing on today's exam aside from biopsy as above -Sun protection was discussed. The proper use of broad spectrum UVB/UVA sunscreen with SPF 30 or greater was reviewed and the need for re-application after swimming or sweating or 2-3 hours was emphasized. We discussed judicious use of clothing and avoidance of peak periods of sun exposure. I made the patient aware of need for year-round protection. ABCDEs of melanoma reviewed. -Self-skin check also reviewed -Counseled pt to monitor for changes   # Screening for skin cancer -ABCDEs of melanoma, sun safety, and self-skin check reviewed -Counseled pt to notify us of any changing or concerning lesions -RTC 12 months, sooner prn

## 2024-05-22 NOTE — HISTORY OF PRESENT ILLNESS
[FreeTextEntry1] : NP spots, moles [de-identified] : NP Here for eval of few bumps and moles on her face and body Also requesting FBSE. 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

## 2024-05-24 LAB — DERMATOLOGY BIOPSY: NORMAL

## 2024-12-18 ENCOUNTER — NON-APPOINTMENT (OUTPATIENT)
Age: 49
End: 2024-12-18

## (undated) DEVICE — ELCTR GROUNDING PAD ADULT COVIDIEN

## (undated) DEVICE — STAPLER SKIN VISI-STAT 35 WIDE

## (undated) DEVICE — LABELS BLANK W PEN

## (undated) DEVICE — DRSG CURITY GAUZE SPONGE 4 X 4" 12-PLY

## (undated) DEVICE — LAP PAD W RING 18 X 18"

## (undated) DEVICE — ELCTR BOVIE PENCIL SMOKE EVACUATION

## (undated) DEVICE — SYR LUER LOK 10CC

## (undated) DEVICE — GLV 6.5 PROTEXIS (CREAM) MICRO

## (undated) DEVICE — SUT ETHILON 8-0 12" TG100-8

## (undated) DEVICE — SAFETY PIN

## (undated) DEVICE — DRSG XEROFORM 5 X 9"

## (undated) DEVICE — WARMING BLANKET FULL ADULT

## (undated) DEVICE — SUT VICRYL 2-0 27" SH UNDYED

## (undated) DEVICE — DRSG TAPE MEDIPORE 6"

## (undated) DEVICE — PACK MINOR VALUE CUSTOM

## (undated) DEVICE — DRAPE 3/4 SHEET 52X76"

## (undated) DEVICE — BLADE SURGICAL #15 CARBON

## (undated) DEVICE — DRSG COMBINE 5X9"

## (undated) DEVICE — DRSG STERISTRIPS 0.5 X 4"

## (undated) DEVICE — DRAIN JACKSON PRATT 10MM FLAT FULL NO TROCAR

## (undated) DEVICE — DRSG BENZOIN 0.6CC

## (undated) DEVICE — SHEATH SURG GUIDE SCOUT DISP STRL

## (undated) DEVICE — NDL HYPO SAFE 25G X 1.5" (ORANGE)

## (undated) DEVICE — SUT NYLON 2-0 18" FS

## (undated) DEVICE — VENODYNE/SCD SLEEVE CALF MEDIUM

## (undated) DEVICE — DRAPE CHEST BREAST 106" X 122"

## (undated) DEVICE — SOL IRR POUR H2O 1500ML

## (undated) DEVICE — POSITIONER STRAP ARMBOARD VELCRO TS-30

## (undated) DEVICE — ONETRAC LIGHTED RETRACTOR 135 X 30MM DISP

## (undated) DEVICE — POSITIONER FOAM EGG CRATE ULNAR 2PCS (PINK)

## (undated) DEVICE — SUT MONOCRYL 4-0 27" PS-2 UNDYED

## (undated) DEVICE — DRSG DERMABOND PRINEO 60CM

## (undated) DEVICE — ELCTR BOVIE TIP BLADE MEGADYNE E-Z CLEAN 6.5" (LONG)

## (undated) DEVICE — ELCTR BOVIE TIP BLADE INSULATED 6.5" EDGE

## (undated) DEVICE — BASIN SET DOUBLE

## (undated) DEVICE — DRAPE INSTRUMENT POUCH 6.75" X 11"

## (undated) DEVICE — GLV 7 PROTEXIS (CREAM) MICRO

## (undated) DEVICE — DRSG DERMABOND 0.7ML

## (undated) DEVICE — DRSG TAPE MEDIPORE 3"

## (undated) DEVICE — DRAPE LAPAROTOMY TRANSVERSE

## (undated) DEVICE — SUT VICRYL 3-0 27" SH UNDYED

## (undated) DEVICE — Device

## (undated) DEVICE — ELCTR PLASMA BLADE X 3.0S LIGHT

## (undated) DEVICE — PACK MAJOR ABDOMINAL WITH LAP

## (undated) DEVICE — DRAIN RESERVOIR FOR JACKSON PRATT 100CC CARDINAL

## (undated) DEVICE — CANISTER DISPOSABLE THIN WALL 3000CC

## (undated) DEVICE — ELCTR BOVIE TIP BLADE INSULATED 2.8" EDGE WITH SAFETY

## (undated) DEVICE — TOURNIQUET CUFF 18" DUAL PORT SINGLE BLADDER (RED)

## (undated) DEVICE — DRSG BIOPATCH DISK W CHG 1" W 4.0MM HOLE

## (undated) DEVICE — SENSOR VIOPTIX TISSUE OXIMETER DISP